# Patient Record
Sex: FEMALE | Race: WHITE | HISPANIC OR LATINO | Employment: FULL TIME | URBAN - METROPOLITAN AREA
[De-identification: names, ages, dates, MRNs, and addresses within clinical notes are randomized per-mention and may not be internally consistent; named-entity substitution may affect disease eponyms.]

---

## 2024-04-02 ENCOUNTER — OFFICE VISIT (OUTPATIENT)
Dept: URGENT CARE | Facility: CLINIC | Age: 69
End: 2024-04-02
Payer: COMMERCIAL

## 2024-04-02 VITALS
TEMPERATURE: 98.6 F | BODY MASS INDEX: 16.9 KG/M2 | DIASTOLIC BLOOD PRESSURE: 98 MMHG | RESPIRATION RATE: 16 BRPM | OXYGEN SATURATION: 98 % | WEIGHT: 99 LBS | HEART RATE: 71 BPM | SYSTOLIC BLOOD PRESSURE: 178 MMHG | HEIGHT: 64 IN

## 2024-04-02 DIAGNOSIS — M25.552 PAIN OF LEFT HIP: ICD-10-CM

## 2024-04-02 DIAGNOSIS — R03.0 ELEVATED BLOOD PRESSURE READING: ICD-10-CM

## 2024-04-02 PROCEDURE — 99213 OFFICE O/P EST LOW 20 MIN: CPT | Performed by: NURSE PRACTITIONER

## 2024-04-02 PROCEDURE — 3077F SYST BP >= 140 MM HG: CPT | Performed by: NURSE PRACTITIONER

## 2024-04-02 PROCEDURE — 3080F DIAST BP >= 90 MM HG: CPT | Performed by: NURSE PRACTITIONER

## 2024-04-02 RX ORDER — KETOROLAC TROMETHAMINE 30 MG/ML
30 INJECTION, SOLUTION INTRAMUSCULAR; INTRAVENOUS ONCE
Status: COMPLETED | OUTPATIENT
Start: 2024-04-02 | End: 2024-04-02

## 2024-04-02 RX ADMIN — KETOROLAC TROMETHAMINE 30 MG: 30 INJECTION, SOLUTION INTRAMUSCULAR; INTRAVENOUS at 12:35

## 2024-04-02 NOTE — PROGRESS NOTES
Chief Complaint   Patient presents with    Hip Pain     Has hip pain X1 week given an injection I the past but currently given Naproxen and not helping with pain       HISTORY OF PRESENT ILLNESS: Patient is a pleasant 68 y.o. female who presents to urgent care today with complaints of left hip pain for the past two weeks. She denies any injury or trauma. She was seen in  after pain started. X-ray negative, given Naproxen without improvement. Pain is continuing, improves with ambulation. She is Ukrainian speaking, a  is used for visit today.     There are no problems to display for this patient.      Allergies:Patient has no known allergies.    Current Outpatient Medications Ordered in Epic   Medication Sig Dispense Refill    naproxen (NAPROSYN) 500 MG Tab Take 1 Tablet by mouth 2 times a day with meals. 60 Tablet 0    Diclofenac Sodium 1 % Gel Apply to right knee and hip TID prn pain (Patient not taking: Reported on 3/18/2024) 100 g 0     No current Epic-ordered facility-administered medications on file.       History reviewed. No pertinent past medical history.    Social History     Tobacco Use    Smoking status: Never    Smokeless tobacco: Never   Substance Use Topics    Alcohol use: No    Drug use: No       No family status information on file.   History reviewed. No pertinent family history.    ROS:  Review of Systems   Constitutional: Negative for fever, chills, weight loss, malaise, and fatigue.   HENT: Negative for ear pain, nosebleeds, congestion, sore throat and neck pain.    Eyes: Negative for vision changes.   Neuro: Negative for headache, sensory changes, weakness, seizure, LOC.   Cardiovascular: Negative for chest pain, palpitations, orthopnea and leg swelling.   Respiratory: Negative for cough, sputum production, shortness of breath and wheezing.   Gastrointestinal: Negative for abdominal pain, nausea, vomiting or diarrhea.   Genitourinary: Negative for dysuria, urgency and  "frequency.  Musculoskeletal: Positive for left hip pain. Negative for falls, neck pain, back pain, myalgias.   Skin: Negative for rash, diaphoresis.     Exam:  BP (!) 178/98 (BP Location: Right arm, Patient Position: Sitting, BP Cuff Size: Child)   Pulse 71   Temp 37 °C (98.6 °F) (Temporal)   Resp 16   Ht 1.626 m (5' 4\")   Wt 44.9 kg (99 lb)   SpO2 98%   General: well-nourished, well-developed female in NAD  Head: normocephalic, atraumatic  Eyes: PERRLA, no conjunctival injection, acuity grossly intact, lids normal.  Ears: normal shape and symmetry, no tenderness, no discharge. External canals are without any significant edema or erythema. Tympanic membranes are without any inflammation, no effusion. Gross auditory acuity is intact.  Nose: symmetrical without tenderness, no discharge.  Mouth/Throat: reasonable hygiene, no erythema, exudates or tonsillar enlargement.  Neck: no masses, range of motion within normal limits, no tracheal deviation. No obvious thyroid enlargement.   Lymph: no cervical adenopathy. No supraclavicular adenopathy.   Neuro: alert and oriented. Cranial nerves 1-12 grossly intact. No sensory deficit.   Cardiovascular: regular rate and rhythm. No edema.  Pulmonary: no distress. Chest is symmetrical with respiration, no wheezes, crackles, or rhonchi.   Musculoskeletal: no clubbing, appropriate muscle tone, gait is stable. No midline spinal tenderness. Left hip: normal in appearance. FROM. Tenderness to lateral aspect of hip. Strength 5/5. N/V intact.   Skin: warm, dry, intact, no clubbing, no cyanosis, no rashes.   Psych: appropriate mood, affect, judgement.         Assessment/Plan:  1. Pain of left hip        2. Elevated blood pressure reading            Patient presents with left hip pain. X-ray reviewed from two weeks ago, negative. Toradol in given in clinic. Tylenol at home. Urgent referral placed to ortho.   Supportive care, differential diagnoses, and indications for immediate " follow-up discussed with patient.   Pathogenesis of diagnosis discussed including typical length and natural progression.   Instructed to return to clinic or nearest emergency department for any change in condition, further concerns, or worsening of symptoms.  Patient states understanding of the plan of care and discharge instructions.  Instructed to make an appointment, for follow up, with her primary care provider.  As a side note, blood pressure found to be elevated today, instructed to follow up with PCP.       Please note that this dictation was created using voice recognition software. I have made every reasonable attempt to correct obvious errors, but I expect that there are errors of grammar and possibly content that I did not discover before finalizing the note.  Previous clinic visit encounter reviewed and considered in medical decision making today.       DANIEL Diallo.

## 2024-12-18 ENCOUNTER — HOSPITAL ENCOUNTER (OUTPATIENT)
Dept: RADIOLOGY | Facility: MEDICAL CENTER | Age: 69
End: 2024-12-18
Attending: FAMILY MEDICINE
Payer: COMMERCIAL

## 2024-12-18 PROCEDURE — 77067 SCR MAMMO BI INCL CAD: CPT

## 2025-02-18 ENCOUNTER — HOSPITAL ENCOUNTER (EMERGENCY)
Facility: MEDICAL CENTER | Age: 70
End: 2025-02-18
Attending: STUDENT IN AN ORGANIZED HEALTH CARE EDUCATION/TRAINING PROGRAM
Payer: COMMERCIAL

## 2025-02-18 ENCOUNTER — APPOINTMENT (OUTPATIENT)
Dept: RADIOLOGY | Facility: MEDICAL CENTER | Age: 70
End: 2025-02-18
Attending: STUDENT IN AN ORGANIZED HEALTH CARE EDUCATION/TRAINING PROGRAM
Payer: COMMERCIAL

## 2025-02-18 VITALS
HEART RATE: 71 BPM | DIASTOLIC BLOOD PRESSURE: 72 MMHG | WEIGHT: 95.68 LBS | OXYGEN SATURATION: 97 % | RESPIRATION RATE: 18 BRPM | HEIGHT: 62 IN | TEMPERATURE: 98.8 F | BODY MASS INDEX: 17.61 KG/M2 | SYSTOLIC BLOOD PRESSURE: 132 MMHG

## 2025-02-18 DIAGNOSIS — G43.801 OTHER MIGRAINE WITH STATUS MIGRAINOSUS, NOT INTRACTABLE: ICD-10-CM

## 2025-02-18 LAB
ALBUMIN SERPL BCP-MCNC: 3.4 G/DL (ref 3.2–4.9)
ALBUMIN/GLOB SERPL: 0.9 G/DL
ALP SERPL-CCNC: 93 U/L (ref 30–99)
ALT SERPL-CCNC: 27 U/L (ref 2–50)
ANION GAP SERPL CALC-SCNC: 12 MMOL/L (ref 7–16)
AST SERPL-CCNC: 30 U/L (ref 12–45)
BASOPHILS # BLD AUTO: 0.6 % (ref 0–1.8)
BASOPHILS # BLD: 0.05 K/UL (ref 0–0.12)
BILIRUB SERPL-MCNC: 0.4 MG/DL (ref 0.1–1.5)
BUN SERPL-MCNC: 20 MG/DL (ref 8–22)
CALCIUM ALBUM COR SERPL-MCNC: 9.6 MG/DL (ref 8.5–10.5)
CALCIUM SERPL-MCNC: 9.1 MG/DL (ref 8.4–10.2)
CHLORIDE SERPL-SCNC: 105 MMOL/L (ref 96–112)
CO2 SERPL-SCNC: 21 MMOL/L (ref 20–33)
CREAT SERPL-MCNC: 0.54 MG/DL (ref 0.5–1.4)
EOSINOPHIL # BLD AUTO: 0.16 K/UL (ref 0–0.51)
EOSINOPHIL NFR BLD: 1.9 % (ref 0–6.9)
ERYTHROCYTE [DISTWIDTH] IN BLOOD BY AUTOMATED COUNT: 38.1 FL (ref 35.9–50)
GFR SERPLBLD CREATININE-BSD FMLA CKD-EPI: 100 ML/MIN/1.73 M 2
GLOBULIN SER CALC-MCNC: 3.9 G/DL (ref 1.9–3.5)
GLUCOSE SERPL-MCNC: 102 MG/DL (ref 65–99)
HCT VFR BLD AUTO: 33 % (ref 37–47)
HGB BLD-MCNC: 10.9 G/DL (ref 12–16)
IMM GRANULOCYTES # BLD AUTO: 0.05 K/UL (ref 0–0.11)
IMM GRANULOCYTES NFR BLD AUTO: 0.6 % (ref 0–0.9)
LYMPHOCYTES # BLD AUTO: 1.09 K/UL (ref 1–4.8)
LYMPHOCYTES NFR BLD: 12.7 % (ref 22–41)
MCH RBC QN AUTO: 29.8 PG (ref 27–33)
MCHC RBC AUTO-ENTMCNC: 33 G/DL (ref 32.2–35.5)
MCV RBC AUTO: 90.2 FL (ref 81.4–97.8)
MONOCYTES # BLD AUTO: 0.64 K/UL (ref 0–0.85)
MONOCYTES NFR BLD AUTO: 7.5 % (ref 0–13.4)
NEUTROPHILS # BLD AUTO: 6.58 K/UL (ref 1.82–7.42)
NEUTROPHILS NFR BLD: 76.7 % (ref 44–72)
NRBC # BLD AUTO: 0 K/UL
NRBC BLD-RTO: 0 /100 WBC (ref 0–0.2)
PLATELET # BLD AUTO: 376 K/UL (ref 164–446)
PMV BLD AUTO: 8.8 FL (ref 9–12.9)
POTASSIUM SERPL-SCNC: 3.7 MMOL/L (ref 3.6–5.5)
PROT SERPL-MCNC: 7.3 G/DL (ref 6–8.2)
RBC # BLD AUTO: 3.66 M/UL (ref 4.2–5.4)
SODIUM SERPL-SCNC: 138 MMOL/L (ref 135–145)
WBC # BLD AUTO: 8.6 K/UL (ref 4.8–10.8)

## 2025-02-18 PROCEDURE — 99284 EMERGENCY DEPT VISIT MOD MDM: CPT

## 2025-02-18 PROCEDURE — 700111 HCHG RX REV CODE 636 W/ 250 OVERRIDE (IP): Performed by: STUDENT IN AN ORGANIZED HEALTH CARE EDUCATION/TRAINING PROGRAM

## 2025-02-18 PROCEDURE — 85025 COMPLETE CBC W/AUTO DIFF WBC: CPT

## 2025-02-18 PROCEDURE — A9270 NON-COVERED ITEM OR SERVICE: HCPCS | Performed by: STUDENT IN AN ORGANIZED HEALTH CARE EDUCATION/TRAINING PROGRAM

## 2025-02-18 PROCEDURE — 36415 COLL VENOUS BLD VENIPUNCTURE: CPT

## 2025-02-18 PROCEDURE — 700102 HCHG RX REV CODE 250 W/ 637 OVERRIDE(OP): Performed by: STUDENT IN AN ORGANIZED HEALTH CARE EDUCATION/TRAINING PROGRAM

## 2025-02-18 PROCEDURE — 80053 COMPREHEN METABOLIC PANEL: CPT

## 2025-02-18 PROCEDURE — 70450 CT HEAD/BRAIN W/O DYE: CPT

## 2025-02-18 PROCEDURE — 96374 THER/PROPH/DIAG INJ IV PUSH: CPT

## 2025-02-18 RX ORDER — KETOROLAC TROMETHAMINE 15 MG/ML
15 INJECTION, SOLUTION INTRAMUSCULAR; INTRAVENOUS ONCE
Status: COMPLETED | OUTPATIENT
Start: 2025-02-18 | End: 2025-02-18

## 2025-02-18 RX ORDER — ACETAMINOPHEN 500 MG
1000 TABLET ORAL ONCE
Status: COMPLETED | OUTPATIENT
Start: 2025-02-18 | End: 2025-02-18

## 2025-02-18 RX ADMIN — ACETAMINOPHEN 1000 MG: 500 TABLET ORAL at 21:35

## 2025-02-18 RX ADMIN — KETOROLAC TROMETHAMINE 15 MG: 15 INJECTION, SOLUTION INTRAMUSCULAR; INTRAVENOUS at 22:00

## 2025-02-18 ASSESSMENT — PAIN DESCRIPTION - PAIN TYPE: TYPE: ACUTE PAIN

## 2025-02-19 NOTE — DISCHARGE INSTRUCTIONS
Please call the attached number to schedule a follow-up appointment with your primary care doctor soon as possible.  Return to the ER with any sudden onset severe returning headache, vision change, blurry or double vision, numbness or weakness of the arms legs or face, recurrent vomiting or if you are otherwise feeling worse.

## 2025-02-19 NOTE — ED TRIAGE NOTES
".  Chief Complaint   Patient presents with    Headache    Ear Pain     PT states that she had headache mostly on the left side that goes to her left ear like 4 days ago. Pt hx of hypertension.     .BP (!) 145/80   Pulse 90   Temp 37.7 °C (99.9 °F) (Temporal)   Resp 20   Ht 1.575 m (5' 2\")   Wt 43.4 kg (95 lb 10.9 oz)   SpO2 96%   BMI 17.50 kg/m²     "

## 2025-02-19 NOTE — ED NOTES
Patient is AAOX4. Breathing is even and unlabored. Patient was read discharge instructions. Patient and family member do not have any questions or concerns at this time. Patient tolerated iv removal. Patient ambulated self unassisted to pov

## 2025-02-19 NOTE — ED PROVIDER NOTES
ED Provider Note    CHIEF COMPLAINT  Chief Complaint   Patient presents with    Headache    Ear Pain     PT states that she had headache mostly on the left side that goes to her left ear like 4 days ago. Pt hx of hypertension.       EXTERNAL RECORDS REVIEWED  Outpatient Notes patient seen by urgent care provider for hip pain and high blood pressure April 2, 2024.  No more recent contributory records available.    HPI/ROS  LIMITATION TO HISTORY   Select: Language Bengali,  Used   OUTSIDE HISTORIAN(S):  Family daughter contributing history    Shannan Berg is a 69 y.o. female who presents to the emergency department for evaluation of 4 days of left-sided throbbing headache.  The patient has a history of headaches presenting similarly but this is lasted much longer and has become more severe.  Headache started gradually and has been worsening.  Pain is located to the temporal aspect of the head and involving the left ear.  She denies facial pain, forehead pain, hearing loss or muffled hearing, ringing in her ear or dizziness.  She denies fever but has had some chills intermittently.  She denies neck pain or stiffness.  She denies vision changes, difficulty swallowing or speaking, difficulty walking, numbness or weakness in the arms legs or face.    PAST MEDICAL HISTORY   High blood pressure    SURGICAL HISTORY  patient denies any surgical history    FAMILY HISTORY  History reviewed. No pertinent family history.    SOCIAL HISTORY  Social History     Tobacco Use    Smoking status: Never    Smokeless tobacco: Never   Vaping Use    Vaping status: Never Used   Substance and Sexual Activity    Alcohol use: No    Drug use: No    Sexual activity: Not on file       CURRENT MEDICATIONS  Home Medications       Reviewed by Wilbert Woodward R.N. (Registered Nurse) on 02/18/25 at 2013  Med List Status: Not Addressed     Medication Last Dose Status   Diclofenac Sodium 1 % Gel  Active   naproxen (NAPROSYN) 500  "MG Tab  Active                    ALLERGIES  No Known Allergies    PHYSICAL EXAM  VITAL SIGNS: BP (!) 145/80   Pulse 90   Temp 37.7 °C (99.9 °F) (Temporal)   Resp 20   Ht 1.575 m (5' 2\")   Wt 43.4 kg (95 lb 10.9 oz)   SpO2 96%   BMI 17.50 kg/m²    Constitutional: No acute distress, pleasant elderly female.  HEENT: Atraumatic, normocephalic, no rash or cutaneous skin changes to the scalp, pupils are equal round reactive to light, nose normal, mouth shows moist mucous membranes, external auditory canals unremarkable.  Left tympanic membrane unremarkable.  No vesicular lesions in the canal.  Neck: Supple, full range of motion.  No tenderness to the neck.  Cardiovascular: Regular rate and rhythm, no murmur, rub or gallop, 2+ pulses peripherally x4  Thorax & Lungs: No respiratory distress, clear breath sounds  GI: Soft, non-distended, non-tender, no rebound  Skin: Warm, dry, no acute rash or lesion  Musculoskeletal: Moving all extremities, no acute deformity, no edema, no tenderness  Neurologic: A&Ox3, at baseline mentation, cranial nerves II through XII intact, ambulatory with a steady gait, negative Romberg, normal finger-nose-finger, 5 out of 5 strength and normal sensation throughout.  Speech is clear.  Psychiatric: Appropriate affect for situation at this time      EKG/LABS  Labs Reviewed   CBC WITH DIFFERENTIAL - Abnormal; Notable for the following components:       Result Value    RBC 3.66 (*)     Hemoglobin 10.9 (*)     Hematocrit 33.0 (*)     MPV 8.8 (*)     Neutrophils-Polys 76.70 (*)     Lymphocytes 12.70 (*)     All other components within normal limits   COMP METABOLIC PANEL - Abnormal; Notable for the following components:    Glucose 102 (*)     Globulin 3.9 (*)     All other components within normal limits   ESTIMATED GFR         RADIOLOGY/PROCEDURES   I have independently interpreted the diagnostic imaging associated with this visit and am waiting the final reading from the radiologist.   My " preliminary interpretation is as follows: No intracranial hemorrhage or large mass lesion    Radiologist interpretation:  CT-HEAD W/O   Final Result         1.  No acute intracranial abnormality.                   COURSE & MEDICAL DECISION MAKING    ASSESSMENT, COURSE AND PLAN  Care Narrative:     Patient presents to the ER for evaluation of 4 days of waxing and waning gradual onset left-sided headache.  History of recurrent headaches previously though no formal diagnosis of migraines.  This headache feels similar to these but has lasted longer.  Her examination is unremarkable.  No neurologic deficits apparent.  No meningismus.  No fevers or infectious prodromal symptoms.  No evidence of Court Hunt syndrome, scalp cellulitis or abscess.  At this point no clinical history or examination suggestive of subarachnoid hemorrhage, meningitis, venous sinus thrombosis.  Did elect to perform noncontrast CT scan of the head to assess for intracranial hemorrhage given patient's age but this was ultimately normal.  Basic laboratory panel largely unremarkable other than for some likely chronic anemia.  No report of any recent bleeding.  Patient medicated for pain with ketorolac and acetaminophen with resolution of her symptoms.  Suspect migraine.  Recommended follow-up with primary care doctor to discuss his presentation and monitor for any recurrent symptoms or discussed need for ongoing evaluation.  Patient comfortable with discharge plan.  Return precautions discussed and all questions answered and she was discharged stable condition      FINAL DIAGNOSIS  1. Other migraine with status migrainosus, not intractable         Electronically signed by: Liban Melton M.D., 2/18/2025 9:15 PM

## 2025-02-27 ENCOUNTER — HOSPITAL ENCOUNTER (EMERGENCY)
Facility: MEDICAL CENTER | Age: 70
End: 2025-02-28
Attending: EMERGENCY MEDICINE
Payer: COMMERCIAL

## 2025-02-27 DIAGNOSIS — R51.9 ACUTE NONINTRACTABLE HEADACHE, UNSPECIFIED HEADACHE TYPE: ICD-10-CM

## 2025-02-27 LAB
FLUAV RNA SPEC QL NAA+PROBE: NEGATIVE
FLUBV RNA SPEC QL NAA+PROBE: NEGATIVE
RSV RNA SPEC QL NAA+PROBE: NEGATIVE
SARS-COV-2 RNA RESP QL NAA+PROBE: NOTDETECTED
SPECIMEN SOURCE: NORMAL

## 2025-02-27 PROCEDURE — 700102 HCHG RX REV CODE 250 W/ 637 OVERRIDE(OP): Performed by: EMERGENCY MEDICINE

## 2025-02-27 PROCEDURE — 99284 EMERGENCY DEPT VISIT MOD MDM: CPT

## 2025-02-27 PROCEDURE — A9270 NON-COVERED ITEM OR SERVICE: HCPCS | Performed by: EMERGENCY MEDICINE

## 2025-02-27 PROCEDURE — 96372 THER/PROPH/DIAG INJ SC/IM: CPT

## 2025-02-27 PROCEDURE — 0241U HCHG SARS-COV-2 COVID-19 NFCT DS RESP RNA 4 TRGT MIC: CPT

## 2025-02-27 PROCEDURE — 700111 HCHG RX REV CODE 636 W/ 250 OVERRIDE (IP): Mod: JZ | Performed by: EMERGENCY MEDICINE

## 2025-02-27 RX ORDER — KETOROLAC TROMETHAMINE 15 MG/ML
15 INJECTION, SOLUTION INTRAMUSCULAR; INTRAVENOUS ONCE
Status: COMPLETED | OUTPATIENT
Start: 2025-02-27 | End: 2025-02-27

## 2025-02-27 RX ORDER — PROMETHAZINE HYDROCHLORIDE 25 MG/1
25 TABLET ORAL ONCE
Status: COMPLETED | OUTPATIENT
Start: 2025-02-27 | End: 2025-02-27

## 2025-02-27 RX ORDER — SUMATRIPTAN 50 MG/1
50 TABLET, FILM COATED ORAL
Status: SHIPPED | COMMUNITY
End: 2025-03-04

## 2025-02-27 RX ADMIN — PROMETHAZINE HYDROCHLORIDE 25 MG: 25 TABLET ORAL at 22:33

## 2025-02-27 RX ADMIN — KETOROLAC TROMETHAMINE 15 MG: 15 INJECTION, SOLUTION INTRAMUSCULAR; INTRAVENOUS at 22:33

## 2025-02-27 ASSESSMENT — FIBROSIS 4 INDEX: FIB4 SCORE: 1.04

## 2025-02-28 VITALS
WEIGHT: 97.22 LBS | HEART RATE: 79 BPM | OXYGEN SATURATION: 95 % | TEMPERATURE: 100.2 F | SYSTOLIC BLOOD PRESSURE: 145 MMHG | HEIGHT: 63 IN | RESPIRATION RATE: 18 BRPM | DIASTOLIC BLOOD PRESSURE: 74 MMHG | BODY MASS INDEX: 17.23 KG/M2

## 2025-02-28 NOTE — ED NOTES
Patient is stable for d/c at this time, anticipatory guidance provided, close follow-up encouraged, and ED return instructions have been detailed. Patient and family are both agreeable to the disposition, and plan and discharged home in ambulatory state and good condition.    
Pt tolerates PO fluids  
None

## 2025-02-28 NOTE — ED TRIAGE NOTES
"Chief Complaint   Patient presents with    Migraine     C/o migraine headache primarily on the left side x1 week seen for same recently. Taking pain meds at home with no relief. No recently illness.    Dizziness    BP (!) 145/74   Pulse 90   Temp (!) 38.1 °C (100.5 °F) (Temporal)   Resp 18   Ht 1.6 m (5' 3\")   Wt 44.1 kg (97 lb 3.6 oz)   SpO2 94%      Patient prescribed PRN 50mg sumatriptan. Daughter states that pt is reluctant and/or non compliant with taking medication for unknown reason and hasn't taken it today. Last dose was at bedtime. Pt took 1g of Tylenol at 1400 with no relief.   "

## 2025-02-28 NOTE — ED PROVIDER NOTES
ED Provider Note    CHIEF COMPLAINT  Chief Complaint   Patient presents with    Migraine     C/o migraine headache primarily on the left side x1 week seen for same recently. Taking pain meds at home with no relief. No recently illness.    Dizziness     C/o dizziness with ambulation. Denies n/v/d        [facility]    Primary care provider: Monster Maldonado M.D.   History obtained from: Patient and daughter  History limited by: None     Shannan Barry is a 69 y.o. female who presents to the ED with daughter complaining of left-sided headache for at least a week.  Patient was seen in this ED on February 18 for the same and CT head without acute findings.  Daughter reports that patient saw her PCP and was prescribed Imitrex.  However, the Imitrex did not help her headache and so she decided not to take it.  She did try some acetaminophen today without improvement.  Patient denies pain anywhere else.  No recent injury or trauma.  No recent illness/fever/congestion/cough.  No shortness of breath/difficulty breathing.  She denies visual change but does report light bothers her slightly.  Patient also complaining of dizziness with ambulation.  No nausea/vomiting/diarrhea/dysuria.    REVIEW OF SYSTEMS  Please see HPI for pertinent positives/negatives.  All other systems reviewed and are negative.     PAST MEDICAL HISTORY  Past Medical History:   Diagnosis Date    Hypertension         SURGICAL HISTORY  History reviewed. No pertinent surgical history.     SOCIAL HISTORY  Social History     Tobacco Use    Smoking status: Never    Smokeless tobacco: Never   Vaping Use    Vaping status: Never Used   Substance and Sexual Activity    Alcohol use: No    Drug use: No    Sexual activity: Not on file        FAMILY HISTORY  History reviewed. No pertinent family history.     CURRENT MEDICATIONS  Home Medications       Reviewed by Nela Hodges R.N. (Registered Nurse) on  "02/27/25 at 2147  Med List Status: Not Addressed     Medication Last Dose Status   Diclofenac Sodium 1 % Gel  Active   naproxen (NAPROSYN) 500 MG Tab  Active   SUMAtriptan (IMITREX) 50 MG Tab 2/26/2025 Active                     ALLERGIES  No Known Allergies     PHYSICAL EXAM  VITAL SIGNS: BP (!) 145/74   Pulse 79   Temp 37.9 °C (100.2 °F) (Temporal)   Resp 18   Ht 1.6 m (5' 3\")   Wt 44.1 kg (97 lb 3.6 oz)   SpO2 95%   BMI 17.22 kg/m²  @[id number]::@     Pulse ox interpretation: 94% I interpret this pulse ox as normal     Constitutional: Well developed, well nourished, alert in no apparent distress, nontoxic appearance    HENT: No external signs of trauma, normocephalic, oropharynx moist and clear   Eyes: PERRL, EOMI, vision and visual fields are grossly intact bilaterally, conjunctiva without erythema, no discharge, no icterus    Neck: Soft and supple, trachea midline, no stridor, no tenderness, no LAD, good ROM    Cardiovascular: Regular rate and rhythm, no murmurs/rubs/gallops, strong distal pulses and good perfusion    Thorax & Lungs: No respiratory distress, CTAB    Abdomen: Soft, nontender, nondistended, no guarding, no rebound, normal BS    Extremities: No cyanosis, no edema, no gross deformity, intact distal pulses with brisk cap refill    Skin: Warm, dry, no pallor/cyanosis, no rash noted      Neuro: Alert and oriented to person, place, and time.  GCS 15.  CN II-XII grossly intact.  Normal speech.  Equal strength bilateral UE/LE.  Sensation intact to touch.  No cerebellar signs including finger-to-nose, rapid alternating hand movements and heel-to-shin bilaterally.  Normal gait.    Psychiatric: Cooperative      NIH STROKE SCALE    1A: Level of Consciousness=0  Alert; keenly responsive 0  Arouses to minor stimulation +1  Requires repeated stimulation to arouse +2  Movements to pain +2  Postures or unresponsive +3    1B: Ask Month and Age=0  Both questions right 0  1 question right +1  0 questions " right +2  Dysarthric/intubated/trauma/language barrier +1  Aphasic +2    1C: 'Blink Eyes' & 'Squeeze Hands'=0  (Pantomime commands if communication barrier)  Performs both tasks 0  Performs 1 task +1  Performs 0 tasks +2    2: Test Horizontal Extraocular Movements=0  Normal 0  Partial gaze palsy: can be overcome +1  Partial gaze palsy: corrects with oculocephalic reflex +1  Forced gaze palsy: cannot be overcome +2    3: Test Visual Fields=0  No visual loss 0  Partial hemianopia +1  Complete hemianopia +2  Patient is bilaterally blind +3  Bilateral hemianopia +3    4: Test Facial Palsy=0  (Use grimace if obtunded)  Normal symmetry 0  Minor paralysis (flat nasolabial fold, smile asymetry) +1  Partial paralysis (lower face) +2  Unilateral complete paralysis (upper/lower face) +3  Bilateral complete paralysis (upper/lower face) +3    5A: Test Left Arm Motor Drift=0  No drift for 10 Seconds 0  Drift, but doesn't hit bed +1  Drift, hits bed +2  Some effort against gravity +2  No effort against gravity +3  No movement +4  Amputation/joint fusion 0    5B: Test Right Arm Motor Drift=0  No drift for 10 seconds 0  Drift, but doesn't hit bed +1  Drift, hits bed +2  Some effort against gravity +2  No effort against gravity +3  No movement +4  Amputation/joint fusion 0    6A: Test Left Leg Motor Drift=0  No drift for 5 Seconds 0  Drift, but doesn't hit bed +1  Drift, hits bed +2  Some effort against gravity +2  No effort against gravity +3  No movement +4  Amputation/joint fusion 0    6B: Test Right Leg Motor Drift=0  No drift for 5 Seconds 0  Drift, but doesn't hit bed +1  Drift, hits bed +2  Some effort against gravity +2  No effort against gravity +3  No movement +4  Amputation/joint fusion 0    7: Test Limb Ataxia =0  (FNF/Heel-Shin)  No ataxia 0  Ataxia in 1 limb +1  Ataxia in 2 limbs +2  Does not understand 0  Paralyzed 0  Amputation/joint fusion 0    8: Test Sensation=0  Normal; no sensory loss 0  Mild-moderate loss: less  sharp/more dull +1  Mild-moderate loss: can sense being touched +1  Complete loss: cannot sense being touched at all +2  No response and quadriplegic +2  Coma/unresponsive +2    9: Test Language/Aphasia=0  Normal; no aphasia 0  Mild-moderate aphasia: some obvious changes, without significant limitation +1  Severe aphasia: fragmentary expression, inference needed, cannot identify materials +2  Mute/global aphasia: no usable speech/auditory comprehension +3  Coma/unresponsive +3    10: Test Dysarthria=0  Normal 0  Mild-moderate dysarthria: slurring but can be understood +1  Severe dysarthria: unintelligble slurring or out of proportion to dysphasia +2  Mute/anarthric +2  Intubated/unable to test 0    11: Test Extinction/Inattention=0  No abnormality 0  Visual/tactile/auditory/spatial/personal inattention +1  Extinction to bilateral simultaneous stimulation +1  Profound cristiano-inattention (ex: does not recognize own hand) +2  Extinction to >1 modality +2      NIH Stroke Scale=0      DIAGNOSTIC STUDIES / PROCEDURES        LABS  All labs reviewed by me.     Results for orders placed or performed during the hospital encounter of 02/27/25   CoV-2, Flu A/B, And RSV by PCR ([employer])    Collection Time: 02/27/25 11:00 PM    Specimen: Nasopharyngeal; Respirate   Result Value Ref Range    Influenza virus A RNA Negative Negative    Influenza virus B, PCR Negative Negative    RSV, PCR Negative Negative    SARS-CoV-2 by PCR NotDetected     SARS-CoV-2 Source NP Swab         RADIOLOGY  I have independently interpreted the diagnostic imaging associated with this visit and am waiting the final reading from the radiologist.     No orders to display          COURSE & MEDICAL DECISION MAKING  Nursing notes, VS, PMSFHx reviewed in chart.     Review of past medical records shows the patient was last seen in this ED February 18, 2025 for similar symptoms and CT head without acute findings.  Patient was treated with Toradol and acetaminophen  with improvement and discharged with diagnosis of migraine.  Patient had outpatient visit on April 2, 2024 regarding left hip pain and elevated blood pressure reading.      Differential diagnoses considered include but are not limited to: Tension/migraine/cluster HA, intracranial hemorrhage/SAH, aneurysm, dissection, intracranial HTN, central venous thrombosis      ED Observation Status? No; Patient does not meet criteria for ED Observation.       Discussion of management with other QHP or appropriate source(s): None     Escalation of care considered, and ultimately not performed: diagnostic imaging.     Decision tools and prescription drugs considered including, but not limited to: Pain Medications   .        History and physical exam as above.  This is a 69-year-old female patient with medical history including hypertension who presents with daughter to the ED with above complaints.  Exam in the ED is benign without evidence of focal neurological findings or concerning features to suggest need for emergent imaging or LP.  Patient was seen in this ED on February 18 with similar complaints and CT head at that time without acute findings.  Patient was treated today with oral Phenergan and IM Toradol with improvement of her symptoms.  She tolerated oral intake without difficulty.  She had low-grade fever and influenza/RSV/COVID testing returned negative.  I discussed the findings with patient and daughter.  On recheck, patient is very pleasant, in no acute distress and nontoxic in appearance.  At this time, I have low clinical suspicion for concerning pathology such as CVA, dissection, meningitis.  I discussed with them supportive home care, outpatient follow-up and return to ED precautions and daughter feels comfortable with monitoring patient at home.  Patient also noted with elevated blood pressure reading without evidence for hypertensive emergency and can follow-up on outpatient basis for further management.   Patient and daughter verbalized understanding and agreed with plan of care with no further questions or concerns.      The patient is referred to a primary physician for blood pressure management, diabetic screening, and for all other preventative health concerns.       FINAL IMPRESSION  1. Acute nonintractable headache, unspecified headache type Acute          DISPOSITION  Patient will be discharged home in stable condition.       FOLLOW UP  Monster Maldonado M.D.  [id number]  [address]  [place] [address]  [phone]    Call today      [facility], Emergency Dept  [address]  [place] [id number] [phone]    If symptoms worsen         OUTPATIENT MEDICATIONS  Discharge Medication List as of 2/28/2025 12:26 AM             Electronically signed by: Alonzo Olson D.O., 2/27/2025 10:09 PM      Portions of this record were made with voice recognition software.  Despite my review, errors may remain.  Please interpret this chart in the appropriate context.

## 2025-03-01 ENCOUNTER — HOSPITAL ENCOUNTER (EMERGENCY)
Facility: MEDICAL CENTER | Age: 70
End: 2025-03-01
Attending: STUDENT IN AN ORGANIZED HEALTH CARE EDUCATION/TRAINING PROGRAM
Payer: COMMERCIAL

## 2025-03-01 ENCOUNTER — APPOINTMENT (OUTPATIENT)
Dept: RADIOLOGY | Facility: MEDICAL CENTER | Age: 70
End: 2025-03-01
Attending: STUDENT IN AN ORGANIZED HEALTH CARE EDUCATION/TRAINING PROGRAM
Payer: COMMERCIAL

## 2025-03-01 VITALS
SYSTOLIC BLOOD PRESSURE: 136 MMHG | BODY MASS INDEX: 16.52 KG/M2 | WEIGHT: 96.78 LBS | OXYGEN SATURATION: 97 % | TEMPERATURE: 98.5 F | HEART RATE: 80 BPM | RESPIRATION RATE: 18 BRPM | HEIGHT: 64 IN | DIASTOLIC BLOOD PRESSURE: 73 MMHG

## 2025-03-01 DIAGNOSIS — G43.801 OTHER MIGRAINE WITH STATUS MIGRAINOSUS, NOT INTRACTABLE: ICD-10-CM

## 2025-03-01 LAB
ALBUMIN SERPL BCP-MCNC: 3.2 G/DL (ref 3.2–4.9)
ALBUMIN/GLOB SERPL: 0.8 G/DL
ALP SERPL-CCNC: 111 U/L (ref 30–99)
ALT SERPL-CCNC: 12 U/L (ref 2–50)
ANION GAP SERPL CALC-SCNC: 11 MMOL/L (ref 7–16)
AST SERPL-CCNC: 15 U/L (ref 12–45)
BASOPHILS # BLD AUTO: 0.4 % (ref 0–1.8)
BASOPHILS # BLD: 0.04 K/UL (ref 0–0.12)
BILIRUB SERPL-MCNC: 0.4 MG/DL (ref 0.1–1.5)
BUN SERPL-MCNC: 14 MG/DL (ref 8–22)
CALCIUM ALBUM COR SERPL-MCNC: 9.9 MG/DL (ref 8.5–10.5)
CALCIUM SERPL-MCNC: 9.3 MG/DL (ref 8.4–10.2)
CHLORIDE SERPL-SCNC: 98 MMOL/L (ref 96–112)
CO2 SERPL-SCNC: 24 MMOL/L (ref 20–33)
CREAT SERPL-MCNC: 0.51 MG/DL (ref 0.5–1.4)
EOSINOPHIL # BLD AUTO: 0.12 K/UL (ref 0–0.51)
EOSINOPHIL NFR BLD: 1.2 % (ref 0–6.9)
ERYTHROCYTE [DISTWIDTH] IN BLOOD BY AUTOMATED COUNT: 39.4 FL (ref 35.9–50)
GFR SERPLBLD CREATININE-BSD FMLA CKD-EPI: 101 ML/MIN/1.73 M 2
GLOBULIN SER CALC-MCNC: 4.2 G/DL (ref 1.9–3.5)
GLUCOSE SERPL-MCNC: 95 MG/DL (ref 65–99)
HCT VFR BLD AUTO: 35 % (ref 37–47)
HGB BLD-MCNC: 11 G/DL (ref 12–16)
IMM GRANULOCYTES # BLD AUTO: 0.07 K/UL (ref 0–0.11)
IMM GRANULOCYTES NFR BLD AUTO: 0.7 % (ref 0–0.9)
LYMPHOCYTES # BLD AUTO: 1.21 K/UL (ref 1–4.8)
LYMPHOCYTES NFR BLD: 12.3 % (ref 22–41)
MCH RBC QN AUTO: 28.5 PG (ref 27–33)
MCHC RBC AUTO-ENTMCNC: 31.4 G/DL (ref 32.2–35.5)
MCV RBC AUTO: 90.7 FL (ref 81.4–97.8)
MONOCYTES # BLD AUTO: 0.71 K/UL (ref 0–0.85)
MONOCYTES NFR BLD AUTO: 7.2 % (ref 0–13.4)
NEUTROPHILS # BLD AUTO: 7.65 K/UL (ref 1.82–7.42)
NEUTROPHILS NFR BLD: 78.2 % (ref 44–72)
NRBC # BLD AUTO: 0 K/UL
NRBC BLD-RTO: 0 /100 WBC (ref 0–0.2)
PLATELET # BLD AUTO: 480 K/UL (ref 164–446)
PMV BLD AUTO: 8.4 FL (ref 9–12.9)
POTASSIUM SERPL-SCNC: 4.1 MMOL/L (ref 3.6–5.5)
PROT SERPL-MCNC: 7.4 G/DL (ref 6–8.2)
RBC # BLD AUTO: 3.86 M/UL (ref 4.2–5.4)
SODIUM SERPL-SCNC: 133 MMOL/L (ref 135–145)
WBC # BLD AUTO: 9.8 K/UL (ref 4.8–10.8)

## 2025-03-01 PROCEDURE — 700105 HCHG RX REV CODE 258: Performed by: STUDENT IN AN ORGANIZED HEALTH CARE EDUCATION/TRAINING PROGRAM

## 2025-03-01 PROCEDURE — 99284 EMERGENCY DEPT VISIT MOD MDM: CPT

## 2025-03-01 PROCEDURE — 80053 COMPREHEN METABOLIC PANEL: CPT

## 2025-03-01 PROCEDURE — 700117 HCHG RX CONTRAST REV CODE 255: Performed by: STUDENT IN AN ORGANIZED HEALTH CARE EDUCATION/TRAINING PROGRAM

## 2025-03-01 PROCEDURE — 700111 HCHG RX REV CODE 636 W/ 250 OVERRIDE (IP): Mod: JZ | Performed by: STUDENT IN AN ORGANIZED HEALTH CARE EDUCATION/TRAINING PROGRAM

## 2025-03-01 PROCEDURE — 96374 THER/PROPH/DIAG INJ IV PUSH: CPT

## 2025-03-01 PROCEDURE — 36415 COLL VENOUS BLD VENIPUNCTURE: CPT

## 2025-03-01 PROCEDURE — 96375 TX/PRO/DX INJ NEW DRUG ADDON: CPT

## 2025-03-01 PROCEDURE — 70496 CT ANGIOGRAPHY HEAD: CPT

## 2025-03-01 PROCEDURE — 700102 HCHG RX REV CODE 250 W/ 637 OVERRIDE(OP): Performed by: STUDENT IN AN ORGANIZED HEALTH CARE EDUCATION/TRAINING PROGRAM

## 2025-03-01 PROCEDURE — 85025 COMPLETE CBC W/AUTO DIFF WBC: CPT

## 2025-03-01 PROCEDURE — A9270 NON-COVERED ITEM OR SERVICE: HCPCS | Performed by: STUDENT IN AN ORGANIZED HEALTH CARE EDUCATION/TRAINING PROGRAM

## 2025-03-01 RX ORDER — DIPHENHYDRAMINE HYDROCHLORIDE 50 MG/ML
25 INJECTION, SOLUTION INTRAMUSCULAR; INTRAVENOUS ONCE
Status: COMPLETED | OUTPATIENT
Start: 2025-03-01 | End: 2025-03-01

## 2025-03-01 RX ORDER — SUMATRIPTAN 50 MG/1
50 TABLET, FILM COATED ORAL
Qty: 10 TABLET | Refills: 3 | Status: ON HOLD | OUTPATIENT
Start: 2025-03-01 | End: 2025-03-06

## 2025-03-01 RX ORDER — PROCHLORPERAZINE EDISYLATE 5 MG/ML
5 INJECTION INTRAMUSCULAR; INTRAVENOUS ONCE
Status: COMPLETED | OUTPATIENT
Start: 2025-03-01 | End: 2025-03-01

## 2025-03-01 RX ORDER — ACETAMINOPHEN 500 MG
1000 TABLET ORAL ONCE
Status: COMPLETED | OUTPATIENT
Start: 2025-03-01 | End: 2025-03-01

## 2025-03-01 RX ORDER — KETOROLAC TROMETHAMINE 15 MG/ML
15 INJECTION, SOLUTION INTRAMUSCULAR; INTRAVENOUS ONCE
Status: COMPLETED | OUTPATIENT
Start: 2025-03-01 | End: 2025-03-01

## 2025-03-01 RX ORDER — SODIUM CHLORIDE, SODIUM LACTATE, POTASSIUM CHLORIDE, AND CALCIUM CHLORIDE .6; .31; .03; .02 G/100ML; G/100ML; G/100ML; G/100ML
1000 INJECTION, SOLUTION INTRAVENOUS ONCE
Status: COMPLETED | OUTPATIENT
Start: 2025-03-01 | End: 2025-03-01

## 2025-03-01 RX ADMIN — ACETAMINOPHEN 1000 MG: 500 TABLET ORAL at 20:09

## 2025-03-01 RX ADMIN — PROCHLORPERAZINE EDISYLATE 5 MG: 5 INJECTION INTRAMUSCULAR; INTRAVENOUS at 20:12

## 2025-03-01 RX ADMIN — KETOROLAC TROMETHAMINE 15 MG: 15 INJECTION, SOLUTION INTRAMUSCULAR; INTRAVENOUS at 20:12

## 2025-03-01 RX ADMIN — DIPHENHYDRAMINE HYDROCHLORIDE 25 MG: 50 INJECTION, SOLUTION INTRAMUSCULAR; INTRAVENOUS at 20:11

## 2025-03-01 RX ADMIN — SODIUM CHLORIDE, POTASSIUM CHLORIDE, SODIUM LACTATE AND CALCIUM CHLORIDE 1000 ML: 600; 310; 30; 20 INJECTION, SOLUTION INTRAVENOUS at 20:09

## 2025-03-01 RX ADMIN — IOHEXOL 90 ML: 350 INJECTION, SOLUTION INTRAVENOUS at 22:34

## 2025-03-01 ASSESSMENT — FIBROSIS 4 INDEX: FIB4 SCORE: 1.04

## 2025-03-02 NOTE — ED PROVIDER NOTES
ED Provider Note    CHIEF COMPLAINT  Chief Complaint   Patient presents with    Headache     LT parietal H/A Constant x 1 wk  3 rd visit for same Had neg CT of head  Denies N/V No fever or chills Denies recent trauma  No hx of chronic H/S  Mild photophobia  Pain severe  Per son pt can't sleep and cries a lot       EXTERNAL RECORDS REVIEWED  Outpatient Notes patient was seen in the emergency department twice in the last 2 weeks for a persistent left-sided headache.  At her first visit she had a CT of her head without contrast that was unremarkable.  Subsequently she was seen and prescribed Imitrex with no improvement.    HPI/ROS  LIMITATION TO HISTORY   Armenian,  used  OUTSIDE HISTORIAN(S):  Family son assisting with history    Shannan Barry is a 69 y.o. female who presents to the emergency department for evaluation of an ongoing left-sided parietal headache.  Pain has been constant now for 2 weeks.  Patient feels briefly better after she has been seen in the ER and gotten medications but states that 2 to 3 hours later the pain returns.  She states the pain is quite severe and she has difficulty sleeping due to the constant throbbing pain.  She states that she has some mild photophobia.  She denies nausea, vomiting, vision changes including blurry or double vision, seizures, numbness tingling or weakness of the arms legs or face, difficulty swallowing or speaking, difficulty walking, dizziness.  She has had no fevers or chills or any additional symptoms.  She has no prior diagnosed history of migraines.  She has seen her family doctor twice as well and is ordered for outpatient MRIs but these are not yet scheduled.    PAST MEDICAL HISTORY   has a past medical history of Hypertension.    SURGICAL HISTORY   has a past surgical history that includes no pertinent past surgical history.    FAMILY HISTORY  History reviewed. No pertinent family history.    SOCIAL HISTORY  Social History     Tobacco  "Use    Smoking status: Never    Smokeless tobacco: Never   Vaping Use    Vaping status: Never Used   Substance and Sexual Activity    Alcohol use: No    Drug use: No    Sexual activity: Not on file       CURRENT MEDICATIONS  Home Medications    **Home medications have not yet been reviewed for this encounter**       Audit from Redirected Encounters    **Home medications have not yet been reviewed for this encounter**         ALLERGIES  No Known Allergies    PHYSICAL EXAM  VITAL SIGNS: /73   Pulse 80   Temp 36.9 °C (98.5 °F) (Temporal)   Resp 18   Ht 1.626 m (5' 4\")   Wt 43.9 kg (96 lb 12.5 oz)   SpO2 97%   BMI 16.61 kg/m²    Constitutional: No acute distress, appearing currently.  HEENT: Atraumatic, normocephalic, pupils are equal round reactive to light, nose normal, mouth shows moist mucous membranes  Neck: Supple, no JVD, no tracheal deviation  Cardiovascular: Regular rate and rhythm, no murmur, rub or gallop, 2+ pulses peripherally x4  Thorax & Lungs: No respiratory distress, no wheezes, rales or rhonchi, no chest wall tenderness.  GI: Soft, non-distended, non-tender, no rebound  Skin: Warm, dry, no acute rash or lesion  Musculoskeletal: Moving all extremities, no acute deformity, no edema, no tenderness  Neurologic: A&Ox3, at baseline mentation, alert to the 12 intact, ambulatory with a steady gait, 5 out of 5 strength and normal sensation throughout upper and lower extremities proximally distally bilaterally.  Ambulatory with a steady gait.  Speech is clear and linear.  Psychiatric: Appropriate affect for situation at this time          EKG/LABS  Labs Reviewed   CBC WITH DIFFERENTIAL - Abnormal; Notable for the following components:       Result Value    RBC 3.86 (*)     Hemoglobin 11.0 (*)     Hematocrit 35.0 (*)     MCHC 31.4 (*)     Platelet Count 480 (*)     MPV 8.4 (*)     Neutrophils-Polys 78.20 (*)     Lymphocytes 12.30 (*)     Neutrophils (Absolute) 7.65 (*)     All other components within " normal limits   COMP METABOLIC PANEL - Abnormal; Notable for the following components:    Sodium 133 (*)     Alkaline Phosphatase 111 (*)     Globulin 4.2 (*)     All other components within normal limits   ESTIMATED GFR         RADIOLOGY/PROCEDURES   I have independently interpreted the diagnostic imaging associated with this visit and am waiting the final reading from the radiologist.   My preliminary interpretation is as follows: CT scan with no obvious venous sinus thrombosis.  No intracranial hemorrhage.    Radiologist interpretation:  CT-CTV HEAD WITH & W/O POST PROCESS   Final Result      1.  No acute intracranial abnormality.   2.  Dural venous sinuses enhance normally.   3.  No mass demonstrated.          COURSE & MEDICAL DECISION MAKING    ASSESSMENT, COURSE AND PLAN  Care Narrative:     Patient presents to the ER now for the fifth total visit for evaluation of an intractable headache, persistent for over 2 weeks.  Has no additional symptomology nor any deficits appreciated on neurologic examination, and remainder of her exam is otherwise completely unremarkable.  Her vitals are stable, she is afebrile and has had no reported fevers at home.  She has no meningismus on examination.  Has previously had noncontrast CT imaging of the brain without evidence of acute intracranial process/bleeding.  Has had no sudden onset or thunderclap nature of pain which has been gradual, waxing and waning and given no associated neurologic deficits, meningismus, vomiting I feel subarachnoid hemorrhage is highly unlikely.  I am concerned for venous sinus thrombosis given the persistence of her symptomology and will evaluate for such with CT venogram of her head.  She has seen her family practitioner on an outpatient basis for this and is ordered for MRI MRA of the brain which is not yet scheduled.  Will again treat with migraine cocktail, repeat labs pending results of CT venogram.     Laboratory workup unchanged from prior  recent labs demonstrating chronic mild anemia for which patient was recently placed on iron supplementation by PCP.  CT venography with no venous sinus thrombosis or mass appreciated.  No additional acute intracranial abnormality found.  On my reassessment patient reports she is symptom-free with complete resolution of her migraine.  Discussed what she has been using to manage her pain and patient has been underdosing Tylenol and not using any of her prescribed Imitrex or any additional analgesia.  Discussed appropriate utilization of Tylenol and Motrin, represcribed Imitrex and also refer the patient for neurology for headache clinic follow-up.  I encouraged her to schedule her previously ordered MRIs as well.  Patient feels very comfortable with discharge plan.  Return precautions discussed and all questions answered and she was discharged in stable condition.    Hydration: Based on the patient's presentation of Dehydration the patient was given IV fluids. IV Hydration was used because oral hydration was not adequate alone. Upon recheck following hydration, the patient was improved.          ADDITIONAL PROBLEMS MANAGED  None    DISPOSITION AND DISCUSSIONS  I have discussed management of the patient with the following physicians and REDD's: None    Discussion of management with other QHP or appropriate source(s): None     Escalation of care considered, and ultimately not performed:acute inpatient care management, however at this time, the patient is most appropriate for outpatient management    Decision tools and prescription drugs considered including, but not limited to: Pain Medications Imitrex .    FINAL DIAGNOSIS  1. Other migraine with status migrainosus, not intractable         Electronically signed by: Liban Melton M.D., 3/1/2025 7:40 PM

## 2025-03-02 NOTE — DISCHARGE INSTRUCTIONS
As we discussed for continued headache pain you should take Tylenol 1000 mg and ibuprofen 600 mg every 6 hours.  When your headache starts to recur I would recommend taking an Imitrex tablet as well.    Call the attached number to schedule a follow-up appointment with a neurologist and also continue to work with your primary doctor.  Call to schedule to have your MRIs done.

## 2025-03-03 ENCOUNTER — TELEPHONE (OUTPATIENT)
Dept: HEALTH INFORMATION MANAGEMENT | Facility: OTHER | Age: 70
End: 2025-03-03
Payer: COMMERCIAL

## 2025-03-04 ENCOUNTER — OFFICE VISIT (OUTPATIENT)
Dept: NEUROLOGY | Facility: MEDICAL CENTER | Age: 70
End: 2025-03-04
Attending: PSYCHIATRY & NEUROLOGY
Payer: COMMERCIAL

## 2025-03-04 ENCOUNTER — APPOINTMENT (OUTPATIENT)
Dept: RADIOLOGY | Facility: MEDICAL CENTER | Age: 70
End: 2025-03-04
Payer: COMMERCIAL

## 2025-03-04 ENCOUNTER — HOSPITAL ENCOUNTER (OUTPATIENT)
Facility: MEDICAL CENTER | Age: 70
End: 2025-03-06
Attending: EMERGENCY MEDICINE | Admitting: FAMILY MEDICINE
Payer: COMMERCIAL

## 2025-03-04 VITALS
HEART RATE: 83 BPM | WEIGHT: 95 LBS | OXYGEN SATURATION: 97 % | BODY MASS INDEX: 16.22 KG/M2 | TEMPERATURE: 98.5 F | DIASTOLIC BLOOD PRESSURE: 80 MMHG | SYSTOLIC BLOOD PRESSURE: 124 MMHG | HEIGHT: 64 IN | RESPIRATION RATE: 16 BRPM

## 2025-03-04 DIAGNOSIS — F32.A DEPRESSION, UNSPECIFIED DEPRESSION TYPE: ICD-10-CM

## 2025-03-04 DIAGNOSIS — R51.9 ACUTE INTRACTABLE HEADACHE, UNSPECIFIED HEADACHE TYPE: ICD-10-CM

## 2025-03-04 LAB
ALBUMIN SERPL BCP-MCNC: 3.3 G/DL (ref 3.2–4.9)
ALBUMIN/GLOB SERPL: 0.8 G/DL
ALP SERPL-CCNC: 101 U/L (ref 30–99)
ALT SERPL-CCNC: 10 U/L (ref 2–50)
ANION GAP SERPL CALC-SCNC: 12 MMOL/L (ref 7–16)
APTT PPP: 30.7 SEC (ref 24.7–36)
AST SERPL-CCNC: 18 U/L (ref 12–45)
BASOPHILS # BLD AUTO: 0.3 % (ref 0–1.8)
BASOPHILS # BLD: 0.03 K/UL (ref 0–0.12)
BILIRUB SERPL-MCNC: 0.4 MG/DL (ref 0.1–1.5)
BUN SERPL-MCNC: 16 MG/DL (ref 8–22)
CALCIUM ALBUM COR SERPL-MCNC: 10 MG/DL (ref 8.5–10.5)
CALCIUM SERPL-MCNC: 9.4 MG/DL (ref 8.5–10.5)
CHLORIDE SERPL-SCNC: 99 MMOL/L (ref 96–112)
CO2 SERPL-SCNC: 25 MMOL/L (ref 20–33)
CREAT SERPL-MCNC: 0.58 MG/DL (ref 0.5–1.4)
CRP SERPL HS-MCNC: 7.62 MG/DL (ref 0–0.75)
EOSINOPHIL # BLD AUTO: 0.04 K/UL (ref 0–0.51)
EOSINOPHIL NFR BLD: 0.4 % (ref 0–6.9)
ERYTHROCYTE [DISTWIDTH] IN BLOOD BY AUTOMATED COUNT: 38.4 FL (ref 35.9–50)
ERYTHROCYTE [SEDIMENTATION RATE] IN BLOOD BY WESTERGREN METHOD: 65 MM/HOUR (ref 0–25)
GFR SERPLBLD CREATININE-BSD FMLA CKD-EPI: 98 ML/MIN/1.73 M 2
GLOBULIN SER CALC-MCNC: 4.4 G/DL (ref 1.9–3.5)
GLUCOSE SERPL-MCNC: 111 MG/DL (ref 65–99)
HCT VFR BLD AUTO: 31.9 % (ref 37–47)
HGB BLD-MCNC: 10.5 G/DL (ref 12–16)
IMM GRANULOCYTES # BLD AUTO: 0.06 K/UL (ref 0–0.11)
IMM GRANULOCYTES NFR BLD AUTO: 0.7 % (ref 0–0.9)
INR PPP: 1.16 (ref 0.87–1.13)
LYMPHOCYTES # BLD AUTO: 0.61 K/UL (ref 1–4.8)
LYMPHOCYTES NFR BLD: 6.8 % (ref 22–41)
MCH RBC QN AUTO: 29.1 PG (ref 27–33)
MCHC RBC AUTO-ENTMCNC: 32.9 G/DL (ref 32.2–35.5)
MCV RBC AUTO: 88.4 FL (ref 81.4–97.8)
MONOCYTES # BLD AUTO: 0.52 K/UL (ref 0–0.85)
MONOCYTES NFR BLD AUTO: 5.8 % (ref 0–13.4)
NEUTROPHILS # BLD AUTO: 7.68 K/UL (ref 1.82–7.42)
NEUTROPHILS NFR BLD: 86 % (ref 44–72)
NRBC # BLD AUTO: 0 K/UL
NRBC BLD-RTO: 0 /100 WBC (ref 0–0.2)
PLATELET # BLD AUTO: 415 K/UL (ref 164–446)
PMV BLD AUTO: 8.3 FL (ref 9–12.9)
POTASSIUM SERPL-SCNC: 4 MMOL/L (ref 3.6–5.5)
PROT SERPL-MCNC: 7.7 G/DL (ref 6–8.2)
PROTHROMBIN TIME: 14.8 SEC (ref 12–14.6)
RBC # BLD AUTO: 3.61 M/UL (ref 4.2–5.4)
SODIUM SERPL-SCNC: 136 MMOL/L (ref 135–145)
WBC # BLD AUTO: 8.9 K/UL (ref 4.8–10.8)

## 2025-03-04 PROCEDURE — 96366 THER/PROPH/DIAG IV INF ADDON: CPT

## 2025-03-04 PROCEDURE — 80053 COMPREHEN METABOLIC PANEL: CPT

## 2025-03-04 PROCEDURE — 85652 RBC SED RATE AUTOMATED: CPT

## 2025-03-04 PROCEDURE — 700102 HCHG RX REV CODE 250 W/ 637 OVERRIDE(OP)

## 2025-03-04 PROCEDURE — 96365 THER/PROPH/DIAG IV INF INIT: CPT

## 2025-03-04 PROCEDURE — 85025 COMPLETE CBC W/AUTO DIFF WBC: CPT

## 2025-03-04 PROCEDURE — 96372 THER/PROPH/DIAG INJ SC/IM: CPT

## 2025-03-04 PROCEDURE — 36415 COLL VENOUS BLD VENIPUNCTURE: CPT

## 2025-03-04 PROCEDURE — 86140 C-REACTIVE PROTEIN: CPT

## 2025-03-04 PROCEDURE — 3079F DIAST BP 80-89 MM HG: CPT | Performed by: PSYCHIATRY & NEUROLOGY

## 2025-03-04 PROCEDURE — A9270 NON-COVERED ITEM OR SERVICE: HCPCS

## 2025-03-04 PROCEDURE — 85610 PROTHROMBIN TIME: CPT

## 2025-03-04 PROCEDURE — 700105 HCHG RX REV CODE 258

## 2025-03-04 PROCEDURE — 99212 OFFICE O/P EST SF 10 MIN: CPT | Performed by: PSYCHIATRY & NEUROLOGY

## 2025-03-04 PROCEDURE — 99215 OFFICE O/P EST HI 40 MIN: CPT | Performed by: STUDENT IN AN ORGANIZED HEALTH CARE EDUCATION/TRAINING PROGRAM

## 2025-03-04 PROCEDURE — 99205 OFFICE O/P NEW HI 60 MIN: CPT | Performed by: PSYCHIATRY & NEUROLOGY

## 2025-03-04 PROCEDURE — 85730 THROMBOPLASTIN TIME PARTIAL: CPT

## 2025-03-04 PROCEDURE — 99285 EMERGENCY DEPT VISIT HI MDM: CPT

## 2025-03-04 PROCEDURE — 99417 PROLNG OP E/M EACH 15 MIN: CPT | Performed by: STUDENT IN AN ORGANIZED HEALTH CARE EDUCATION/TRAINING PROGRAM

## 2025-03-04 PROCEDURE — 96375 TX/PRO/DX INJ NEW DRUG ADDON: CPT

## 2025-03-04 PROCEDURE — 770006 HCHG ROOM/CARE - MED/SURG/GYN SEMI*

## 2025-03-04 PROCEDURE — G2212 PROLONG OUTPT/OFFICE VIS: HCPCS | Performed by: PSYCHIATRY & NEUROLOGY

## 2025-03-04 PROCEDURE — 96374 THER/PROPH/DIAG INJ IV PUSH: CPT

## 2025-03-04 PROCEDURE — 700111 HCHG RX REV CODE 636 W/ 250 OVERRIDE (IP)

## 2025-03-04 PROCEDURE — 700111 HCHG RX REV CODE 636 W/ 250 OVERRIDE (IP): Mod: JZ | Performed by: EMERGENCY MEDICINE

## 2025-03-04 PROCEDURE — 3074F SYST BP LT 130 MM HG: CPT | Performed by: PSYCHIATRY & NEUROLOGY

## 2025-03-04 RX ORDER — KETOROLAC TROMETHAMINE 15 MG/ML
15 INJECTION, SOLUTION INTRAMUSCULAR; INTRAVENOUS ONCE
Status: COMPLETED | OUTPATIENT
Start: 2025-03-04 | End: 2025-03-04

## 2025-03-04 RX ORDER — POLYETHYLENE GLYCOL 3350 17 G/17G
1 POWDER, FOR SOLUTION ORAL
Status: DISCONTINUED | OUTPATIENT
Start: 2025-03-04 | End: 2025-03-06 | Stop reason: HOSPADM

## 2025-03-04 RX ORDER — FERROUS SULFATE 325(65) MG
325 TABLET ORAL
COMMUNITY
Start: 2025-02-28

## 2025-03-04 RX ORDER — MAGNESIUM SULFATE HEPTAHYDRATE 40 MG/ML
3 INJECTION, SOLUTION INTRAVENOUS ONCE
Status: COMPLETED | OUTPATIENT
Start: 2025-03-04 | End: 2025-03-04

## 2025-03-04 RX ORDER — ACETAMINOPHEN 500 MG
1000 TABLET ORAL EVERY 8 HOURS PRN
Status: DISCONTINUED | OUTPATIENT
Start: 2025-03-04 | End: 2025-03-06 | Stop reason: HOSPADM

## 2025-03-04 RX ORDER — ACETAMINOPHEN 500 MG
1000 TABLET ORAL EVERY 6 HOURS PRN
Status: DISCONTINUED | OUTPATIENT
Start: 2025-03-09 | End: 2025-03-04

## 2025-03-04 RX ORDER — OXYCODONE HYDROCHLORIDE 5 MG/1
2.5 TABLET ORAL
Refills: 0 | Status: DISCONTINUED | OUTPATIENT
Start: 2025-03-04 | End: 2025-03-06 | Stop reason: HOSPADM

## 2025-03-04 RX ORDER — ACETAMINOPHEN 325 MG/1
650 TABLET ORAL EVERY 8 HOURS PRN
COMMUNITY

## 2025-03-04 RX ORDER — HYDROMORPHONE HYDROCHLORIDE 1 MG/ML
0.25 INJECTION, SOLUTION INTRAMUSCULAR; INTRAVENOUS; SUBCUTANEOUS
Status: DISCONTINUED | OUTPATIENT
Start: 2025-03-04 | End: 2025-03-06 | Stop reason: HOSPADM

## 2025-03-04 RX ORDER — OXYCODONE HYDROCHLORIDE 5 MG/1
5 TABLET ORAL
Refills: 0 | Status: DISCONTINUED | OUTPATIENT
Start: 2025-03-04 | End: 2025-03-06 | Stop reason: HOSPADM

## 2025-03-04 RX ORDER — SODIUM CHLORIDE 9 MG/ML
1000 INJECTION, SOLUTION INTRAVENOUS ONCE
Status: COMPLETED | OUTPATIENT
Start: 2025-03-04 | End: 2025-03-04

## 2025-03-04 RX ORDER — HEPARIN SODIUM 5000 [USP'U]/ML
5000 INJECTION, SOLUTION INTRAVENOUS; SUBCUTANEOUS EVERY 12 HOURS
Status: DISCONTINUED | OUTPATIENT
Start: 2025-03-04 | End: 2025-03-06 | Stop reason: HOSPADM

## 2025-03-04 RX ORDER — AMOXICILLIN 250 MG
2 CAPSULE ORAL EVERY EVENING
Status: DISCONTINUED | OUTPATIENT
Start: 2025-03-04 | End: 2025-03-06 | Stop reason: HOSPADM

## 2025-03-04 RX ORDER — TRAMADOL HYDROCHLORIDE 50 MG/1
50 TABLET ORAL EVERY 8 HOURS PRN
COMMUNITY
Start: 2025-02-28

## 2025-03-04 RX ORDER — ACETAMINOPHEN 500 MG
1000 TABLET ORAL EVERY 6 HOURS
Status: DISCONTINUED | OUTPATIENT
Start: 2025-03-04 | End: 2025-03-04

## 2025-03-04 RX ORDER — LORAZEPAM 0.5 MG/1
0.5 TABLET ORAL ONCE
Status: COMPLETED | OUTPATIENT
Start: 2025-03-04 | End: 2025-03-04

## 2025-03-04 RX ADMIN — OXYCODONE HYDROCHLORIDE 5 MG: 5 TABLET ORAL at 22:12

## 2025-03-04 RX ADMIN — MAGNESIUM SULFATE HEPTAHYDRATE 4 G: 4 INJECTION, SOLUTION INTRAVENOUS at 15:27

## 2025-03-04 RX ADMIN — FENTANYL CITRATE 50 MCG: 50 INJECTION, SOLUTION INTRAMUSCULAR; INTRAVENOUS at 11:44

## 2025-03-04 RX ADMIN — LORAZEPAM 0.5 MG: 0.5 TABLET ORAL at 23:14

## 2025-03-04 RX ADMIN — SENNOSIDES AND DOCUSATE SODIUM 2 TABLET: 50; 8.6 TABLET ORAL at 17:18

## 2025-03-04 RX ADMIN — KETOROLAC TROMETHAMINE 15 MG: 15 INJECTION, SOLUTION INTRAMUSCULAR; INTRAVENOUS at 11:58

## 2025-03-04 RX ADMIN — ACETAMINOPHEN 1000 MG: 500 TABLET ORAL at 15:56

## 2025-03-04 RX ADMIN — HYDROMORPHONE HYDROCHLORIDE 0.25 MG: 1 INJECTION, SOLUTION INTRAMUSCULAR; INTRAVENOUS; SUBCUTANEOUS at 23:13

## 2025-03-04 RX ADMIN — SODIUM CHLORIDE 1000 ML: 9 INJECTION, SOLUTION INTRAVENOUS at 15:22

## 2025-03-04 RX ADMIN — HEPARIN SODIUM 5000 UNITS: 5000 INJECTION, SOLUTION INTRAVENOUS; SUBCUTANEOUS at 17:19

## 2025-03-04 SDOH — ECONOMIC STABILITY: TRANSPORTATION INSECURITY
IN THE PAST 12 MONTHS, HAS THE LACK OF TRANSPORTATION KEPT YOU FROM MEDICAL APPOINTMENTS OR FROM GETTING MEDICATIONS?: NO

## 2025-03-04 SDOH — ECONOMIC STABILITY: TRANSPORTATION INSECURITY
IN THE PAST 12 MONTHS, HAS LACK OF RELIABLE TRANSPORTATION KEPT YOU FROM MEDICAL APPOINTMENTS, MEETINGS, WORK OR FROM GETTING THINGS NEEDED FOR DAILY LIVING?: NO

## 2025-03-04 ASSESSMENT — PAIN DESCRIPTION - PAIN TYPE
TYPE: ACUTE PAIN

## 2025-03-04 ASSESSMENT — COGNITIVE AND FUNCTIONAL STATUS - GENERAL
SUGGESTED CMS G CODE MODIFIER MOBILITY: CJ
HELP NEEDED FOR BATHING: A LITTLE
CLIMB 3 TO 5 STEPS WITH RAILING: A LITTLE
MOBILITY SCORE: 22
DAILY ACTIVITIY SCORE: 22
WALKING IN HOSPITAL ROOM: A LITTLE
SUGGESTED CMS G CODE MODIFIER DAILY ACTIVITY: CJ
DRESSING REGULAR LOWER BODY CLOTHING: A LITTLE

## 2025-03-04 ASSESSMENT — LIFESTYLE VARIABLES
CONSUMPTION TOTAL: NEGATIVE
ON A TYPICAL DAY WHEN YOU DRINK ALCOHOL HOW MANY DRINKS DO YOU HAVE: 0
TOTAL SCORE: 0
TOTAL SCORE: 0
DOES PATIENT WANT TO STOP DRINKING: NO
TOTAL SCORE: 0
EVER HAD A DRINK FIRST THING IN THE MORNING TO STEADY YOUR NERVES TO GET RID OF A HANGOVER: NO
ALCOHOL_USE: NO
HAVE YOU EVER FELT YOU SHOULD CUT DOWN ON YOUR DRINKING: NO
EVER FELT BAD OR GUILTY ABOUT YOUR DRINKING: NO
HAVE PEOPLE ANNOYED YOU BY CRITICIZING YOUR DRINKING: NO
AVERAGE NUMBER OF DAYS PER WEEK YOU HAVE A DRINK CONTAINING ALCOHOL: 0
HOW MANY TIMES IN THE PAST YEAR HAVE YOU HAD 5 OR MORE DRINKS IN A DAY: 0

## 2025-03-04 ASSESSMENT — SOCIAL DETERMINANTS OF HEALTH (SDOH)
WITHIN THE PAST 12 MONTHS, YOU WORRIED THAT YOUR FOOD WOULD RUN OUT BEFORE YOU GOT THE MONEY TO BUY MORE: NEVER TRUE
WITHIN THE PAST 12 MONTHS, THE FOOD YOU BOUGHT JUST DIDN'T LAST AND YOU DIDN'T HAVE MONEY TO GET MORE: NEVER TRUE
IN THE PAST 12 MONTHS, HAS THE ELECTRIC, GAS, OIL, OR WATER COMPANY THREATENED TO SHUT OFF SERVICE IN YOUR HOME?: NO
WITHIN THE LAST YEAR, HAVE YOU BEEN HUMILIATED OR EMOTIONALLY ABUSED IN OTHER WAYS BY YOUR PARTNER OR EX-PARTNER?: NO
WITHIN THE LAST YEAR, HAVE YOU BEEN KICKED, HIT, SLAPPED, OR OTHERWISE PHYSICALLY HURT BY YOUR PARTNER OR EX-PARTNER?: NO
WITHIN THE LAST YEAR, HAVE TO BEEN RAPED OR FORCED TO HAVE ANY KIND OF SEXUAL ACTIVITY BY YOUR PARTNER OR EX-PARTNER?: NO
WITHIN THE LAST YEAR, HAVE YOU BEEN AFRAID OF YOUR PARTNER OR EX-PARTNER?: NO

## 2025-03-04 ASSESSMENT — PATIENT HEALTH QUESTIONNAIRE - PHQ9
CLINICAL INTERPRETATION OF PHQ2 SCORE: 6
SUM OF ALL RESPONSES TO PHQ QUESTIONS 1-9: 25
1. LITTLE INTEREST OR PLEASURE IN DOING THINGS: NOT AT ALL
5. POOR APPETITE OR OVEREATING: 3 - NEARLY EVERY DAY
SUM OF ALL RESPONSES TO PHQ9 QUESTIONS 1 AND 2: 0
2. FEELING DOWN, DEPRESSED, IRRITABLE, OR HOPELESS: NOT AT ALL

## 2025-03-04 ASSESSMENT — FIBROSIS 4 INDEX
FIB4 SCORE: 0.61
FIB4 SCORE: 0.61

## 2025-03-04 NOTE — ASSESSMENT & PLAN NOTE
Patient with intractable, severe, positional headache for the past 3 weeks.  Presented here from her neurologist's office as a direct transfer to the ED, has had multiple ER visits for the same issue.  Associated with worsened headache with lying down flat as well as with looking down.  Features are atypical for migraine.  Given length of time, severity, and atypical characteristics of headache will initiate workup for possible secondary headache in setting of mass lesion vs malignancy vs vasculitis.  Also possible atypical migraine.  - Neurology consulted in ED, appreciate recommendations:   - Completed migraine cocktail including 1 L NS, Tylenol 1 g every 8 hours as needed, magnesium 4 g IV  - MRI brain with and without contrast ordered and showed no acute abnormalities  - MRA brain without contrast ordered and showed no acute abnormalities  - ESR and CRP ordered, CRP elevated at 7.62, ESR elevated at 65  -Per neurology recommendations, will begin indomethacin 3 times daily, will also add on omeprazole 20 mg daily for GI protection.  If patient does not have response to indomethacin, will plan to start Medrol Dosepak.  - As needed low-dose oxycodone 2.5 and 5 mg with breakthrough Dilaudid ordered  - Will add on prn antiemetics if needed

## 2025-03-04 NOTE — LETTER
[facility]  [facility]  [address] [place] [address]  [phone]     March 6, 2025    Patient: Shannan Barry   YOB: 1955   Date of Visit: 3/4/2025       To Whom It May Concern:    Shannan Barry was seen and treated in our department from 3/4-3/6/2025.     Patient to remain off work until able to be seen by primary care provider.     Sincerely,     Domitila Wells M.D.

## 2025-03-04 NOTE — H&P
"    FAMILY MEDICINE HISTORY AND PHYSICAL       PATIENT ID:  NAME:  Shannan Barry  MRN:               [id number]  YOB: 1955    Date of Admission: 3/4/2025     Attending: Pepito Ocampo M.d.     Resident: Domitila Wells M.D.    Primary Care Physician:  Monster Maldonado M.D.    CC:    Chief Complaint   Patient presents with    Headache     X3 weeks, left sided throbbing pain. Patient reports she has been unable to sleep due to pain.     Transfer note stating possible SI. Denies SI to this RN, but \"wants the pain to go away, but would not hurt self\".        HPI: Shannan Barry is a 69 y.o. female with no significant past medical history who presented with intractable positional headache.  She was seen in her outpatient neurologist office on day of admission and sent to the ED for further workup and admission.  Patient reports sudden onset of severe, throbbing, sharp, left-sided headache on 2/10/2025.  States that this same pain has been constant since that time.  Worsens with positional changes, especially lying down flat.  Also reports worsening headache with looking down and leaning forwards.  Has tried Imitrex at home which did not have any impact on the headache.  Also has been taking tramadol as needed at home similarly without change in headache.  The only medication so far that has helped.  Pain is IV fentanyl which she has received in the ED.  Has had multiple ED visits since 2/18 for this pain.  Denies any confusion, weakness, numbness/tingling, joint pains, dysuria, hematuria, nausea vomiting, chest pain, shortness of breath.  Also denies phonophobia, photophobia.  Has no prior history of migraine headaches.  Denies any family history of migraine headaches.     ERCourse:  On arrival to the ED, patient's vital signs were overall stable other than blood pressure elevated at 166/78.  Lab work including CBC, CMP, INR ordered and showed normocytic anemia with hemoglobin 10.5, " stable from prior visits.  CT head from prior ED visit showed no acute intracranial abnormality.  Neurologist on-call consulted from ED.    REVIEW OF SYSTEMS:   Ten systems reviewed and were negative except as noted in the HPI.                PAST MEDICAL HISTORY:   has a past medical history of Hypertension.     PAST SURGICAL HISTORY:   has a past surgical history that includes no pertinent past surgical history.     FAMILY HISTORY:  family history is not on file.     SOCIAL HISTORY:   Living Situation: Lives in [place] with son  Smoking: Denies  Etoh: Denies  Recreational Drug: Denies    DIET:   Orders Placed This Encounter   Procedures    Diet Order Diet: Regular     Standing Status:   Standing     Number of Occurrences:   1     Diet::   Regular [1]       ALLERGIES:  No Known Allergies    OUTPATIENT MEDICATIONS:    Current Facility-Administered Medications:     magnesium sulfate IVPB premix 4 g, 4 g, Intravenous, Once, Domitila Wells M.D., Last Rate: 25 mL/hr at 03/04/25 1527, 4 g at 03/04/25 1527    acetaminophen (Tylenol) tablet 1,000 mg, 1,000 mg, Oral, Q8HRS PRN, Domitila Wells M.D., 1,000 mg at 03/04/25 1556    heparin injection 5,000 Units, 5,000 Units, Subcutaneous, Q12HRS, Domitila Wells M.D.    senna-docusate (Pericolace Or Senokot S) 8.6-50 MG per tablet 2 Tablet, 2 Tablet, Oral, Q EVENING **AND** polyethylene glycol/lytes (Miralax) Packet 1 Packet, 1 Packet, Oral, QDAY PRN, Domitila Wells M.D.    Pharmacy Consult Request ...Pain Management Review 1 Each, 1 Each, Other, PHARMACY TO DOSE, Domitila Wells M.D.    oxyCODONE immediate-release (Roxicodone) tablet 2.5 mg, 2.5 mg, Oral, Q3HRS PRN **OR** oxyCODONE immediate-release (Roxicodone) tablet 5 mg, 5 mg, Oral, Q3HRS PRN **OR** HYDROmorphone (Dilaudid) injection 0.25 mg, 0.25 mg, Intravenous, Q3HRS PRN, Domitila Wells M.D.    PHYSICAL EXAM:  Vitals:    03/04/25 1206 03/04/25 1308 03/04/25 1330 03/04/25 1503   BP: 130/67 139/74  113/72   Pulse:  78 87  70   Resp: 14 18 18 16   Temp:  37.3 °C (99.2 °F)  37.7 °C (99.8 °F)   TempSrc:  Temporal  Temporal   SpO2: 94% 96%  98%   Weight:       Height:       , Temp (24hrs), Av °C (98.6 °F), Min:35.9 °C (96.7 °F), Max:37.7 °C (99.8 °F)  , Pulse Oximetry: 98 %, O2 (LPM): 0, O2 Delivery Device: None - Room Air    Physical Exam  Constitutional:       General: She is not in acute distress.     Appearance: Normal appearance. She is normal weight. She is not toxic-appearing.   HENT:      Head: Normocephalic and atraumatic.      Mouth/Throat:      Mouth: Mucous membranes are moist.      Pharynx: Oropharynx is clear. No oropharyngeal exudate or posterior oropharyngeal erythema.   Eyes:      Extraocular Movements: Extraocular movements intact.      Conjunctiva/sclera: Conjunctivae normal.      Pupils: Pupils are equal, round, and reactive to light.   Cardiovascular:      Rate and Rhythm: Normal rate and regular rhythm.      Pulses: Normal pulses.      Heart sounds: Normal heart sounds.   Pulmonary:      Effort: Pulmonary effort is normal. No respiratory distress.      Breath sounds: Normal breath sounds. No wheezing or rales.   Abdominal:      General: Abdomen is flat. Bowel sounds are normal.      Palpations: Abdomen is soft.   Skin:     General: Skin is warm and dry.   Neurological:      General: No focal deficit present.      Mental Status: She is alert and oriented to person, place, and time.      Cranial Nerves: No cranial nerve deficit.      Sensory: No sensory deficit.      Motor: No weakness.      Gait: Gait normal.      Comments: Strength and sensation intact in bilateral upper and lower extremities.       LAB TESTS:   Recent Labs     25  1154   WBC 9.8 8.9   RBC 3.86* 3.61*   HEMOGLOBIN 11.0* 10.5*   HEMATOCRIT 35.0* 31.9*   MCV 90.7 88.4   MCH 28.5 29.1   MCHC 31.4* 32.9   RDW 39.4 38.4   PLATELETCT 480* 415   MPV 8.4* 8.3*     Recent Labs     25  1154   SODIUM 133*  "136   POTASSIUM 4.1 4.0   CHLORIDE 98 99   CO2 24 25   GLUCOSE 95 111*   BUN 14 16   CREATININE 0.51 0.58   CALCIUM 9.3 9.4     Recent Labs     03/01/25 2008 03/04/25  1154   ALTSGPT 12 10   ASTSGOT 15 18   ALKPHOSPHAT 111* 101*   TBILIRUBIN 0.4 0.4   GLUCOSE 95 111*     Recent Labs     03/04/25  1154   APTT 30.7   INR 1.16*     No results for input(s): \"NTPROBNP\" in the last 72 hours.      No results for input(s): \"TROPONINT\" in the last 72 hours.    CULTURES:   Results       ** No results found for the last 168 hours. **          IMAGES:  MR-BRAIN-WITH & W/O    (Results Pending)   MR-MRA HEAD-W/O    (Results Pending)     CONSULTS:   Neurology, Dr. Vasquez    ASSESSMENT/PLAN:   69 y.o. female admitted for severe, intractable, positional headache per outpatient neurologist, here for brain imaging to rule out secondary headache in setting of mass lesion or vasculitis vs atypical migraine.     I anticipate this patient will require at least two midnights for appropriate medical management, necessitating inpatient admission because workup of possible secondary headache with brain imaging, possible need for further workup with LP pending results    * Acute intractable headache, unspecified headache type- (present on admission)  Assessment & Plan  Patient with intractable, severe, positional headache for the past 3 weeks.  Presented here from her neurologist's office as a direct transfer to the ED, has had multiple ER visits for the same issue.  Associated with worsened headache with lying down flat as well as with looking down.  Features are atypical for migraine.  Given length of time, severity, and atypical characteristics of headache will initiate workup for possible secondary headache in setting of mass lesion vs malignancy vs vasculitis.  Also possible atypical migraine.  - Neurology consulted in ED, appreciate recommendations:   - Will order migraine cocktail including 1 L NS, Tylenol 1 g every 8 hours as " needed, magnesium 4 g IV  - MRI brain with and without contrast ordered, pending  - MRA brain without contrast ordered, pending  - ESR and CRP ordered, CRP elevated at 7.62, ESR pending  - As needed low-dose oxycodone 2.5 and 5 mg with breakthrough Dilaudid ordered  - Will add on prn antiemetics if needed      Core Measures:  Fluids: P.o.  Lines: PIV  Abx: None  Diet: Regular  PPX: SCDs/TEDs and heparin ppx    CODE Status: Full Code    Domitila Wells M.D.  PGY-2  [facility]

## 2025-03-04 NOTE — ED NOTES
Report given to Tanesha RN.  Pt to S632-2, on room air in stable condition with transport, all belongings with pt. Son at bedside

## 2025-03-04 NOTE — PROGRESS NOTES
[facility] Headache Program  New Patient Visit      Patient's Name: Shannan Barry  YOB: 1955  MRN: [id number]  Date of Service: 03/04/25.    Referring Provider: Liban Melton M.D.  [address] [facility]  [address]  [place] [address]    Chief Concern: Headaches.     The patient presents with her son, and provides verbal consent for him to be present and provide additional information as necessary.  This visit was conducted with the help of a formal .    HPI: The patient is a 69 y.o., right-handed female, who initially presented to my headache clinic for evaluation of severe headache on 3/4/20/2025.    The patient has a new onset headache as of early 2025, but the exact details varied between information obtained today and over the 3 ER visits over the last month or so.    During the visit with me today, the patient shared that she never had headaches before mid February 2025.  The headache started suddenly, and was severe from the beginning, and it never fully resolved.  The headache is described as severe, left parietal region head pain and tenderness.  The quality is pounding and when more severe stabbing.  She has no photophobia, no phonophobia, and she vomited for the first time on 3/3/2025 in context of this headache.    If the headache is exquisitely severe, the patient is not able to lay down due to worsening of her headache, but when the headache is at a severity of 6-8, she is able to lay down.  The patient also shared that the headache is worse when she looks down.    She denies neck pain.  She denies any focal neurological symptoms, such as weakness/numbness/changes in speech.  No fevers.  No tenderness in the temporal regions.  No ear pain.    These headaches are so severe, that she is not able to sleep, and she lost 5 pounds over the last couple weeks, though she has always been slim.    The patient was prescribed Toradol, as well as  "tramadol, but these would only transiently and partially helped.  Imitrex was prescribed as outpatient, and did not help at all.    The patient wished to die due to these headaches, but no actual plan to harm herself.     Pertinent Ancillary Test Results:    MRI brain studies:   - MRI brain - none available for my review.     CT head studies:   - CT head wo contrast (02/18/2025 at [facility]): Unremarkable.      - CT venogram head (03/01/2025 at [facility]): \"IMPRESSION:   1.  No acute intracranial abnormality.  2.  Dural venous sinuses enhance normally.  3.  No mass demonstrated.\"    Current Acute Headache Treatment Medications: Toradol during ER visits. Tramadol PRN.     Previously Acute Headache Treatment Medications: Sumatriptan (ineffective).     Current Headache Preventative Medications: None.     Previously Headache Preventative Medications: None     Review of Systems: No recent fevers. She lost 5 lbs in the interim. The mood is overall stable. No SI/HI.     Past Medical History:  Past Medical History:   Diagnosis Date    Hypertension      Past Surgical History:  Past Surgical History:   Procedure Laterality Date    NO PERTINENT PAST SURGICAL HISTORY        Social History:  Social History     Tobacco Use    Smoking status: Never    Smokeless tobacco: Never   Vaping Use    Vaping status: Never Used   Substance Use Topics    Alcohol use: No    Drug use: No     Family History:  There is no known family history of migraine headaches        3/4/2025     9:20 AM   PHQ-9 Screening   Little interest or pleasure in doing things 3 - nearly every day   Feeling down, depressed, or hopeless 3 - nearly every day   Trouble falling or staying asleep, or sleeping too much 3 - nearly every day   Feeling tired or having little energy 3 - nearly every day   Poor appetite or overeating 3 - nearly every day   Feeling bad about yourself - or that you are a failure or have let yourself or your family down 3 - nearly every day   Trouble " "concentrating on things, such as reading the newspaper or watching television 3 - nearly every day   Moving or speaking so slowly that other people could have noticed. Or the opposite - being so fidgety or restless that you have been moving around a lot more than usual 1 - several days   Thoughts that you would be better off dead, or of hurting yourself in some way 3 - nearly every day   PHQ-2 Total Score 6   PHQ-9 Total Score 25       [place] Suicide Severity Rating Scale   Wish to be Dead?: Yes  Suicidal Thoughts: No    Suicidal Thoughts with Method Without Specific Plan or Intent to Act:    Suicidal Intent Without Specific Plan:    Suicide Intent with Specific Plan:    Suicide Behavior Question: No  How long ago did you do any of these?:    C-SSRS Risk Level: Low Risk    Additional Suicide Screening Questions    Suspected or Confirmed Suicide Attempted?: No  Harming or killing others?: No    Allergies:  No Known Allergies    Current Medications:    Current Outpatient Medications:     FeroSul, 325 mg, Oral, Q3 DAYS, Taking    traMADol, 50 mg, Oral, Q8HRS PRN, PRN    SUMAtriptan, 50 mg, Oral, Once PRN, PRN    SUMAtriptan, 50 mg, Oral, Once PRN, PRN    naproxen, 500 mg, Oral, BID WITH MEALS, Taking    Diclofenac Sodium, Apply to right knee and hip TID prn pain (Patient not taking: Reported on 3/4/2025), Not Taking    Physical Examination:    Ambulatory Vitals  Encounter Vitals  Temperature: 36.9 °C (98.5 °F)  Temp src: Temporal  Blood Pressure : 124/80  Pulse: 83  Respiration: 16  Pulse Oximetry: 97 %  Weight: 43.1 kg (95 lb)  Height: 162.6 cm (5' 4\")  BMI (Calculated): 16.31    Neurological Examination:  Mental Status: The patient is alert and oriented to person, place, time, and situation. Speech is fluent, with no aphasia nor dysarthria noted. Affect is normal.    Cranial Nerve Examination:  CN I: Olfaction examination is deferred.  CN II: Visual fields are full to confrontation examination and show no visual " field defect.   CN III, IV, VI: Eye movements are normal in all directions. Pupils are reactive to direct and consensual light. There is no relative afferent pupillary defect. There is no nystagmus.  CN V: Facial sensation to light touch is intact throughout.   CN VII: No significant facial muscle or other soft tissue asymmetry.  CN VIII: Hearing intact to rubbing sounds bilaterally.   CN IX, X: Soft palate elevates symmetrically.  CN XI: Symmetrical shoulder shrug exam.  CN XII: Midline tongue protrusion and moves symmetrically to each side.     Motor Examination:  Muscle strength is intact throughout. Muscle tone is normal throughout. No abnormal movements are observed. No pronator drift is noted.     Muscle Stretch Reflexes Examination:  Deferred.    Sensory Examination:  Preserved sensation to light touch in all extremities.     Coordination:  Normal finger to nose testing bilaterally, no postural nor intentional tremor was noted.     Stance/gait:  Normal regular gait with normal arm swings and stride length. Mild difficulties with tandem gait. Romberg sign is absent.     ASSESSMENT AND PLAN:  1. Depression, unspecified depression type  - Patient has been identified as having a positive depression screening. Appropriate orders and counseling have been given.    2. Acute intractable headache, unspecified headache type  The patient has an acute headache syndrome, which is unclear if it is primary or secondary.  I had a detailed discussion with the patient, her son, as well as our inpatient neurology consultant, and we agreed to proceed with the ER admission, due to intractable headaches, patient's inability to sleep, and her suicidal ideation due to severe pain.  A workup to be considered is MRI brain with and without contrast, imaging of cervical and intracranial arterial system, as well as consideration for lumbar puncture.    - once the workup is completed, and if no secondary causes are found, we will further  discuss headache preventative treatment and acute headache treatment.   - a significant amount of time was spent coordinating patient's care.     Follow up in 3 months.     My total time spent caring for the patient on the day of the encounter was 78 minutes.   This does not include time spent on separately billable procedures/tests.      Kevin Yanes MD  Outpatient Neurology   [facility]

## 2025-03-04 NOTE — PROGRESS NOTES
4 Eyes Skin Assessment Completed by RADHA Almendarez and RADHA Mccauley.    Head WDL  Ears WDL  Nose WDL  Mouth WDL  Neck WDL  Breast/Chest WDL  Shoulder Blades WDL  Spine WDL  (R) Arm/Elbow/Hand WDL  (L) Arm/Elbow/Hand WDL  Abdomen WDL  Groin WDL  Scrotum/Coccyx/Buttocks WDL  (R) Leg WDL  (L) Leg WDL  (R) Heel/Foot/Toe Redness and Blanching, bunion  (L) Heel/Foot/Toe Redness and Blanching, bunion          Devices In Places N/A      Interventions In Place Pillows    Possible Skin Injury No    Pictures Uploaded Into Epic N/A  Wound Consult Placed N/A  RN Wound Prevention Protocol Ordered No

## 2025-03-04 NOTE — CONSULTS
[facility]   DEPARTMENT OF NEUROLOGY    INITIAL ENCOUNTER     MRN: [id number]  Patient seen at the request of: Dr. Dheeraj Mijares M.D.  Chief Complaint/Reason for Consult:  headache    IMPRESSION & RECOMMENDATIONS:   Primary vs. Secondary headache disorder. Rule out secondary causes via further imaging as recommended below. Low suspicion for space occupying lesion or RCVS given lack of associated neurological deficits with headache despite clear positional component. Headache without clear migrainous features though migraine still in the differential diagnosis. Consider nummular headache as well.     - MRI Brain w/wo  - MRA Brain w/o  - ESR/CRP, serum  - Outpatient neurology f/u  1. Recommend the following migraine cocktail:  - 1L NS bolus  - Mg 3 g IV piggyback x1 dose  - Tylenol 1g q8 hours PRN    2. If patient's HA improves, recommend discharging home with:  - Magnesium oxide 400mg QD   - Tylenol 650mg q6mg PRN  - Naproxen 200-400mg q12 PRN  Encouraged headache hygiene, including but not limited to: sleeping well, frequent exercise, and a healthy diet.       We will continue to follow.     Signed:  Eliceo Vasquez DO  Department of Neurology  [facility]    HISTORY OF PRESENT ILLNESS:   Shannan Barry is an 69 y.o. right-handed female who presented to the ED with episodic headaches. History was obtained from patient.     Headaches started three weeks ago, was working as a house keeper. Initially severity was about a 3/10, and involved the left parietal region. No associated neurological symptoms, and headache resolved after approximately 30 minutes. Approximately one week later she presented to the ED with a headache, and states it was maximal intensity 10/10 at onset, same location, and the quality was throbbing/pulsating. This lead to the 2/18 presentation noted at [facility]. She received toradol, which she states helped, but did not resolve her headache  "and it continued into the next day and this has continued to this day, with fluctuating severity but never resolves. States the headache is limited to the left parietal region, in a discrete fashion, without radiation. Per chart review, \"Daughter reports that patient saw her PCP and was prescribed Imitrex. However, the Imitrex did not help her headache and so she decided not to take it.\".     After receiving toradol in the ED here her headache severity is a 5/10.     Current migraine headache:  - Aura: none  - Worse with lying flat, unsure about coughing, not worse with bearing down  - No recent changes in sleep, diet, or exercise regimen  - Interfering with ADLs  - No nausea, no vomiting, no light/sound sensitivity   - Associated autonomic features: none     Systemic symptoms: denies fever, fatigue, myalgias, weight loss, neck pain  Associated neuro signs: denies changes in personality, LOC, AMS, weakness/numbness/tingling, vision changes.    PAST MEDICAL HISTORY:     Active Ambulatory Problems     Diagnosis Date Noted    No Active Ambulatory Problems     Resolved Ambulatory Problems     Diagnosis Date Noted    No Resolved Ambulatory Problems     Past Medical History:   Diagnosis Date    Hypertension         PAST SURGICAL HISTORY:     Past Surgical History:   Procedure Laterality Date    NO PERTINENT PAST SURGICAL HISTORY         CURRENT MEDICATIONS:     No current facility-administered medications for this encounter.     Current Outpatient Medications   Medication Sig Dispense Refill    FEROSUL 325 (65 Fe) MG tablet Take 325 mg by mouth every 3 days.      traMADol (ULTRAM) 50 MG Tab Take 50 mg by mouth every 8 hours as needed for Moderate Pain.      SUMAtriptan (IMITREX) 50 MG Tab Take 1 Tablet by mouth one time as needed for Migraine for up to 1 dose. 10 Tablet 3    SUMAtriptan (IMITREX) 50 MG Tab Take 50 mg by mouth one time as needed for Migraine. Indications: Migraine Headache      naproxen (NAPROSYN) 500 MG " Tab Take 1 Tablet by mouth 2 times a day with meals. 60 Tablet 0    Diclofenac Sodium 1 % Gel Apply to right knee and hip TID prn pain (Patient not taking: Reported on 3/4/2025) 100 g 0       ALLERGIES:   No Known Allergies     SOCIAL HISTORY:   Does not smoke or drinks  , with one daughter, one son    FAMILY HISTORY:   History reviewed. No pertinent family history.   Diabetes    REVIEW OF SYSTEMS:   For the following ROS, pertinent positives are in bold; pertinent negatives are in italics.  CONSTITUTIONAL: fevers, chills, sweats, weight loss, fatigue  EYES: vision changes, double vision, blurring  ENMT: hearing loss, tinnitus, epistaxis, sores, voice changes, sore throat  CV: chest pain, dyspnea, palpitations, orthopnea, lower extremity edema  RESP: cough, sputum, dyspnea, hemoptysis, pleuritic pain  GI: abdominal pain, nausea, vomiting, hematemesis, diarrhea, constipation, BRBPR, melena  : hematuria, dysuria, frequency, urgency  MSKL: pain, muscle weakness, joint pain, joint swelling, decreased ROM  SKIN: rash, pain, pruritis, lesions, lumps, skin breakdown  NEURO: See HPI  PSYCH: depression, anxiety, suicidal ideation, homicidal ideation     PHYSICAL EXAM:     Vitals:    03/04/25 1126   BP: (!) 173/80   Pulse: 81   Resp: 15   Temp:    SpO2: 97%       NEUROLOGIC EXAM    Mental Status:  Level of alertness: Awake  Orientation: Oriented to self, location, time & situation  Attention:  Able to perform serial 7s, able to spell WORLD forwards and backwards  Speech: Normal quality, quantity, rate, volume and prosody  Language: Fluent, no aphasia or paraphasic errors, normal semantic fluency  Short Term Memory: Intact to 5 objects after 5 minutes.   Higher Cognitive Function: No apraxia, no finger agnosia  Fund of Knowledge: Knows current president and , able to name 3 current events    Cranial Nerves:   Pupils equally round 3 mm and reactive to light 2 mm  Extraocular movements intact without  "nystagmus  Full visual fields bilaterally to confrontation  Facial sensation to light touch intact bilaterally over V1-V3  No facial asymmetry  Hearing intact   No dysarthria  Tongue and palate midline  Shoulder shrug and neck turn intact.    Motor: Normal bulk and tone  No fasciculations. No spasticity. No cogwheel. No pronator drift. Sustained Agx4     Reflexes:    Root Right Left Comment   biceps C5 2 2     brachioradialis C6 2 2     triceps C7 2 2     patella L3 (L4) 2 2     achilles S1 (S2) 2 2     jaw jerk CNV * *     pecs C5-T1 * *     magana C8-T1 neg neg     crossed adductors L2-L4 * *     plantar response S1-S2 Flexor Flexor     *Not tested    Sensory:   SILT x4    Coordination: No dysmetria on finger to nose or heel to shin    Gait: Deferred       DATA:     Labs  Lab Results   Component Value Date    GLUCOSE 95 03/01/2025    BUN 14 03/01/2025    CO2 24 03/01/2025     Lab Results   Component Value Date/Time    WBC 9.8 03/01/2025 2008    MCV 90.7 03/01/2025 2008      No results found for: \"TSH\"  No components found for: \"OMYFEJ3QS2\", \"H7EYBMQ\", \"T4AUTO\", \"L0GOIRK\"  No components found for: \"HGBA1C\"  Lab Results   Component Value Date    HDL 79 08/16/2019    LDL 95 08/16/2019     No results found for: \"JRUK00FZ\"  No results found for: \"FERRITIN\", \"FOLATE\"  No results found for: \"PT\", \"INR\"  No results found for: \"MHYHLCCY90\", \"RPR\"  Calcium   Date Value Ref Range Status   03/01/2025 9.3 8.4 - 10.2 mg/dL Final     Albumin   Date Value Ref Range Status   03/01/2025 3.2 3.2 - 4.9 g/dL Final     Hemoglobin   Date Value Ref Range Status   03/01/2025 11.0 (L) 12.0 - 16.0 g/dL Final     @lastb12@    Imaging:   Images were personally reviewed by me.     Total time spent: 60 minutes    "

## 2025-03-04 NOTE — ED NOTES
Pharmacy Medication Reconciliation      ~Medication reconciliation updated and complete per patient & son interpreting at bedside  ~Allergies have been verified and updated   ~No oral ABX within the last 30 days  ~Is dispense history available: yes   ~[facility] pharmacy :  [facility] [phone]      ~Anticoagulants (rivaroxaban, apixaban, edoxaban, dabigatran, warfarin, enoxaparin) taken in the last 14 days? No

## 2025-03-04 NOTE — LETTER
[facility]  DISCHARGE LOUNGE  [address] [facility] [facility] [address]  [phone]     March 6, 2025    Patient: Shannan Barry   YOB: 1955   Date of Visit: 3/4/2025       To Whom It May Concern:    Shannan Barry was seen and treated in our department from 3/4-3/6/2025.     Patient to remain off of work until able to be seen by primary care doctor.     Sincerely,     Domitila Wells M.D.

## 2025-03-04 NOTE — PATIENT INSTRUCTIONS
Please keep the diary of your headaches.    Please keep the diary of provoking factors for your headaches (e.g. alcohol, certain foods, etc).     Please take riboflavin (vitamin B2) 400 mg daily and magnesium oxide 400 mg daily for headache prevention (over-the-counter).    Please let our office know if you experience any changes in your neurological status and/or any changes in the nature of your headaches.

## 2025-03-04 NOTE — ED TRIAGE NOTES
"Chief Complaint   Patient presents with    Headache     X3 weeks, left sided throbbing pain. Patient reports she has been unable to sleep due to pain.     Transfer note stating possible SI. Denies SI to this RN, but \"wants the pain to go away, but would not hurt self\".      Pt ambulatory to triage for above complaint.      Pt is alert/oriented and follows commands. Pt speaking in full sentences and responds appropriately to questions. No acute distress noted in triage and respirations are even and unlabored.     Pt placed in lobby and educated on triage process. Pt encouraged to alert staff for any changes in condition.   "

## 2025-03-04 NOTE — ED PROVIDER NOTES
"ED Provider Note    CHIEF COMPLAINT  Chief Complaint   Patient presents with    Headache     X3 weeks, left sided throbbing pain. Patient reports she has been unable to sleep due to pain.     Transfer note stating possible SI. Denies SI to this RN, but \"wants the pain to go away, but would not hurt self\".        HPI/ROS    Shannan Barry is a 69 y.o. female who presents with an intractable headache.  The patient's been having this headache for about 3 weeks.  She is been seen in the emerged department a couple of times as well as her primary care provider.  She was seen by the neurologist today and sent here for further inpatient workup for the intractable headache.  The patient has not had any associated vomiting.  She states the pain is so severe that she would rather die than continue to live with the discomfort.  She has not had any suicidal problems in the past.  She does have a known history of hypertension.  She has not had any associated fevers.    PAST MEDICAL HISTORY   has a past medical history of Hypertension.    SURGICAL HISTORY   has a past surgical history that includes no pertinent past surgical history.    FAMILY HISTORY  History reviewed. No pertinent family history.    SOCIAL HISTORY  Social History     Tobacco Use    Smoking status: Never    Smokeless tobacco: Never   Vaping Use    Vaping status: Never Used   Substance and Sexual Activity    Alcohol use: No    Drug use: No    Sexual activity: Not on file       CURRENT MEDICATIONS  Home Medications       Reviewed by Nury Almanzar R.N. (Registered Nurse) on 03/04/25 at 1108  Med List Status: Partial     Medication Last Dose Status   Diclofenac Sodium 1 % Gel  Active   FEROSUL 325 (65 Fe) MG tablet  Active   naproxen (NAPROSYN) 500 MG Tab  Active   SUMAtriptan (IMITREX) 50 MG Tab  Active   SUMAtriptan (IMITREX) 50 MG Tab  Active   traMADol (ULTRAM) 50 MG Tab  Active                    ALLERGIES  No Known Allergies    PHYSICAL " "EXAM  VITAL SIGNS: BP (!) 173/80   Pulse 81   Temp 35.9 °C (96.7 °F) (Temporal)   Resp 15   Ht 1.626 m (5' 4\")   Wt 44.2 kg (97 lb 7.1 oz)   SpO2 97%   BMI 16.73 kg/m²    General The patient appears uncomfortable but nontoxic    Head is normocephalic atraumatic, ears TMs intact with erythema, nares and mouth are moist    Eyes pupils are 2 and reactive bilaterally    Pulmonary the patient's lungs are clear auscultation bilaterally    Cardiovascular S1-S2 with a regular rate and rhythm    GI abdomen soft    Skin no rashes, pallor, no jaundice    Extremities atraumatic    Neurologic examination is grossly intact      COURSE & MEDICAL DECISION MAKING    This is a 69-year-old female who presents the emerged part with an intractable headache.  I did speak with neurology and they recommend further inpatient treatment and workup due to the severity of the patient's pain.  She will receive Toradol as well as fentanyl for pain control.  The patient will be admitted to the hospitalist for further workup including an MRI with and without contrast.  General neurology will consult on the case.    FINAL DIAGNOSIS  Intractable headache    Disposition  The patient will be admitted in stable condition     Electronically signed by: Dheeraj Mijares M.D., 3/4/2025 11:37 AM      "

## 2025-03-04 NOTE — LETTER
[facility]  DISCHARGE LOUNGE  [address]  [place] [address]  [phone]     March 6, 2025    Patient: Shannan Barry   YOB: 1955   Date of Visit: 3/4/2025       To Whom It May Concern:    Shannan Barry was seen and treated in our department from 3/4-3/6/2025.     Patient to remain off of work until able to be seen by her primary care provider.     Sincerely,     Domitila Wells M.D.

## 2025-03-04 NOTE — ED NOTES
Patient brought back to room and changing into gown, son bedside. Primary RN notified that pt is in room.

## 2025-03-05 LAB
ERYTHROCYTE [DISTWIDTH] IN BLOOD BY AUTOMATED COUNT: 40.1 FL (ref 35.9–50)
HCT VFR BLD AUTO: 33.1 % (ref 37–47)
HGB BLD-MCNC: 10.4 G/DL (ref 12–16)
MCH RBC QN AUTO: 28.4 PG (ref 27–33)
MCHC RBC AUTO-ENTMCNC: 31.4 G/DL (ref 32.2–35.5)
MCV RBC AUTO: 90.4 FL (ref 81.4–97.8)
PLATELET # BLD AUTO: 415 K/UL (ref 164–446)
PMV BLD AUTO: 8.5 FL (ref 9–12.9)
RBC # BLD AUTO: 3.66 M/UL (ref 4.2–5.4)
WBC # BLD AUTO: 9.2 K/UL (ref 4.8–10.8)

## 2025-03-05 PROCEDURE — 700117 HCHG RX CONTRAST REV CODE 255: Mod: JZ

## 2025-03-05 PROCEDURE — 96376 TX/PRO/DX INJ SAME DRUG ADON: CPT

## 2025-03-05 PROCEDURE — 96372 THER/PROPH/DIAG INJ SC/IM: CPT

## 2025-03-05 PROCEDURE — 99232 SBSQ HOSP IP/OBS MODERATE 35: CPT | Performed by: STUDENT IN AN ORGANIZED HEALTH CARE EDUCATION/TRAINING PROGRAM

## 2025-03-05 PROCEDURE — 700111 HCHG RX REV CODE 636 W/ 250 OVERRIDE (IP)

## 2025-03-05 PROCEDURE — 700102 HCHG RX REV CODE 250 W/ 637 OVERRIDE(OP)

## 2025-03-05 PROCEDURE — A9579 GAD-BASE MR CONTRAST NOS,1ML: HCPCS | Mod: JZ

## 2025-03-05 PROCEDURE — 70553 MRI BRAIN STEM W/O & W/DYE: CPT

## 2025-03-05 PROCEDURE — 85027 COMPLETE CBC AUTOMATED: CPT

## 2025-03-05 PROCEDURE — A9270 NON-COVERED ITEM OR SERVICE: HCPCS

## 2025-03-05 PROCEDURE — 70544 MR ANGIOGRAPHY HEAD W/O DYE: CPT

## 2025-03-05 PROCEDURE — G0378 HOSPITAL OBSERVATION PER HR: HCPCS

## 2025-03-05 RX ORDER — OMEPRAZOLE 20 MG/1
20 CAPSULE, DELAYED RELEASE ORAL DAILY
Status: DISCONTINUED | OUTPATIENT
Start: 2025-03-05 | End: 2025-03-06 | Stop reason: HOSPADM

## 2025-03-05 RX ORDER — INDOMETHACIN 25 MG/1
25 CAPSULE ORAL
Status: DISCONTINUED | OUTPATIENT
Start: 2025-03-05 | End: 2025-03-05

## 2025-03-05 RX ORDER — KETOROLAC TROMETHAMINE 15 MG/ML
15 INJECTION, SOLUTION INTRAMUSCULAR; INTRAVENOUS ONCE
Status: COMPLETED | OUTPATIENT
Start: 2025-03-05 | End: 2025-03-05

## 2025-03-05 RX ORDER — INDOMETHACIN 50 MG/1
50 CAPSULE ORAL
Status: DISCONTINUED | OUTPATIENT
Start: 2025-03-05 | End: 2025-03-06 | Stop reason: HOSPADM

## 2025-03-05 RX ADMIN — SENNOSIDES AND DOCUSATE SODIUM 2 TABLET: 50; 8.6 TABLET ORAL at 17:55

## 2025-03-05 RX ADMIN — HEPARIN SODIUM 5000 UNITS: 5000 INJECTION, SOLUTION INTRAVENOUS; SUBCUTANEOUS at 05:21

## 2025-03-05 RX ADMIN — GADOTERIDOL 10 ML: 279.3 INJECTION, SOLUTION INTRAVENOUS at 00:06

## 2025-03-05 RX ADMIN — OMEPRAZOLE 20 MG: 20 CAPSULE, DELAYED RELEASE ORAL at 16:09

## 2025-03-05 RX ADMIN — KETOROLAC TROMETHAMINE 15 MG: 15 INJECTION, SOLUTION INTRAMUSCULAR; INTRAVENOUS at 09:30

## 2025-03-05 RX ADMIN — HEPARIN SODIUM 5000 UNITS: 5000 INJECTION, SOLUTION INTRAVENOUS; SUBCUTANEOUS at 17:58

## 2025-03-05 RX ADMIN — ACETAMINOPHEN 1000 MG: 500 TABLET ORAL at 00:52

## 2025-03-05 RX ADMIN — INDOMETHACIN 50 MG: 50 CAPSULE ORAL at 18:13

## 2025-03-05 RX ADMIN — ACETAMINOPHEN 1000 MG: 500 TABLET ORAL at 16:13

## 2025-03-05 ASSESSMENT — PAIN DESCRIPTION - PAIN TYPE
TYPE: ACUTE PAIN

## 2025-03-05 ASSESSMENT — PATIENT HEALTH QUESTIONNAIRE - PHQ9
2. FEELING DOWN, DEPRESSED, IRRITABLE, OR HOPELESS: NOT AT ALL
1. LITTLE INTEREST OR PLEASURE IN DOING THINGS: NOT AT ALL
SUM OF ALL RESPONSES TO PHQ9 QUESTIONS 1 AND 2: 0

## 2025-03-05 NOTE — CONSULTS
[facility]   DEPARTMENT OF NEUROLOGY    FOLLOW UP ENCOUNTER     MRN: [id number]  Patient seen at the request of: Dr. Dheeraj Mijares M.D.  Chief Complaint/Reason for Consult:  headache    INTERVAL EVENTS:   - headache severity greatly improved as of this morning. Once again reporting significant improvement in pain with toradol.   - esr/crp significantly elevated, even after being age adjusted  - MRI Brain w/wo and MRA studies have been largely underwhelming.   - No new focal neurological deficits.     IMPRESSION & RECOMMENDATIONS:   New onset headache without migrainous features. Suspect this is a primary headache disorder, secondary culprits have largely been rule out with extensive CNS imaging. Though inflammatory markers are significantly elevated, they are non-specific, and my suspicion for GCA is fairly low given the lack of accompanying symptoms: no jaw claudication, temporal tenderness, and PMR symptoms. Headache without clear migrainous features though migraine still in the differential diagnosis. Consider nummular headache as well. Hemicrania continua is also in the differential though lack of autonomic cranial features make this far less likely, though patient's response to NSAIDs raise the question of whether a indomethacin trial may be helpful here.     - indomethacin trial with 25 mg every eight hours; can be further titrated up or down based on clinical course.   - can consider medrol dose zena if no response to indomethacin  - outpatient neurology follow up    Neurology will sign off at this time. Please reach back out to us with any questions or concerns.    Signed:  Eliceo Vasquez DO  Department of Neurology  [facility]    HISTORY OF PRESENT ILLNESS:   Shannan Barry is an 69 y.o. right-handed female who presented to the ED with episodic headaches. History was obtained from patient.     Headaches started three weeks ago, was working as a house  "keeper. Initially severity was about a 3/10, and involved the left parietal region. No associated neurological symptoms, and headache resolved after approximately 30 minutes. Approximately one week later she presented to the ED with a headache, and states it was maximal intensity 10/10 at onset, same location, and the quality was throbbing/pulsating. This lead to the 2/18 presentation noted at [facility]. She received toradol, which she states helped, but did not resolve her headache and it continued into the next day and this has continued to this day, with fluctuating severity but never resolves. States the headache is limited to the left parietal region, in a discrete fashion, without radiation. Per chart review, \"Daughter reports that patient saw her PCP and was prescribed Imitrex. However, the Imitrex did not help her headache and so she decided not to take it.\".     After receiving toradol in the ED here her headache severity is a 5/10.     Current migraine headache:  - Aura: none  - Worse with lying flat, unsure about coughing, not worse with bearing down  - No recent changes in sleep, diet, or exercise regimen  - Interfering with ADLs  - No nausea, no vomiting, no light/sound sensitivity   - Associated autonomic features: none     Systemic symptoms: denies fever, fatigue, myalgias, weight loss, neck pain  Associated neuro signs: denies changes in personality, LOC, AMS, weakness/numbness/tingling, vision changes.    PAST MEDICAL HISTORY:     Active Ambulatory Problems     Diagnosis Date Noted    No Active Ambulatory Problems     Resolved Ambulatory Problems     Diagnosis Date Noted    No Resolved Ambulatory Problems     Past Medical History:   Diagnosis Date    Hypertension         PAST SURGICAL HISTORY:     Past Surgical History:   Procedure Laterality Date    NO PERTINENT PAST SURGICAL HISTORY         CURRENT MEDICATIONS:     Current Facility-Administered Medications   Medication Dose Route Frequency " Provider Last Rate Last Admin    ketorolac (Toradol) 15 MG/ML injection 15 mg  15 mg Intravenous Once Domitila Wells M.D.        acetaminophen (Tylenol) tablet 1,000 mg  1,000 mg Oral Q8HRS PRN Domitila Wells M.D.   1,000 mg at 03/05/25 0052    heparin injection 5,000 Units  5,000 Units Subcutaneous Q12HRS Domitila Wells M.D.   5,000 Units at 03/05/25 0521    senna-docusate (Pericolace Or Senokot S) 8.6-50 MG per tablet 2 Tablet  2 Tablet Oral Q EVENING Domitila Wells M.D.   2 Tablet at 03/04/25 1718    And    polyethylene glycol/lytes (Miralax) Packet 1 Packet  1 Packet Oral QDAY PRN Domitila Wells M.D.        Pharmacy Consult Request ...Pain Management Review 1 Each  1 Each Other PHARMACY TO DOSE Domitila Wells M.D.        oxyCODONE immediate-release (Roxicodone) tablet 2.5 mg  2.5 mg Oral Q3HRS PRN Domitila Wells M.D.        Or    oxyCODONE immediate-release (Roxicodone) tablet 5 mg  5 mg Oral Q3HRS PRN Domitila Wells M.D.   5 mg at 03/04/25 2212    Or    HYDROmorphone (Dilaudid) injection 0.25 mg  0.25 mg Intravenous Q3HRS PRN Domitila Wells M.D.   0.25 mg at 03/04/25 2313       ALLERGIES:   No Known Allergies     SOCIAL HISTORY:   Does not smoke or drinks  , with one daughter, one son    FAMILY HISTORY:   History reviewed. No pertinent family history.   Diabetes    REVIEW OF SYSTEMS:   For the following ROS, pertinent positives are in bold; pertinent negatives are in italics.  CONSTITUTIONAL: fevers, chills, sweats, weight loss, fatigue  EYES: vision changes, double vision, blurring  ENMT: hearing loss, tinnitus, epistaxis, sores, voice changes, sore throat  CV: chest pain, dyspnea, palpitations, orthopnea, lower extremity edema  RESP: cough, sputum, dyspnea, hemoptysis, pleuritic pain  GI: abdominal pain, nausea, vomiting, hematemesis, diarrhea, constipation, BRBPR, melena  : hematuria, dysuria, frequency, urgency  MSKL: pain, muscle weakness, joint pain, joint swelling, decreased  ROM  SKIN: rash, pain, pruritis, lesions, lumps, skin breakdown  NEURO: See HPI  PSYCH: depression, anxiety, suicidal ideation, homicidal ideation     PHYSICAL EXAM:     Vitals:    03/05/25 0735   BP: 114/72   Pulse: 70   Resp: 18   Temp: 36.7 °C (98 °F)   SpO2: 96%       NEUROLOGIC EXAM    Mental Status:  Level of alertness: Awake  Orientation: Oriented to self, location, time & situation  Attention:  Able to perform serial 7s, able to spell WORLD forwards and backwards  Speech: Normal quality, quantity, rate, volume and prosody  Language: Fluent, no aphasia or paraphasic errors, normal semantic fluency  Short Term Memory: Intact to 5 objects after 5 minutes.   Higher Cognitive Function: No apraxia, no finger agnosia  Fund of Knowledge: Knows current president and , able to name 3 current events    Cranial Nerves:   Pupils equally round 3 mm and reactive to light 2 mm  Extraocular movements intact without nystagmus  Full visual fields bilaterally to confrontation  Facial sensation to light touch intact bilaterally over V1-V3  No facial asymmetry  Hearing intact   No dysarthria  Tongue and palate midline  Shoulder shrug and neck turn intact.    Motor: Normal bulk and tone  No fasciculations. No spasticity. No cogwheel. No pronator drift. Sustained Agx4     Reflexes:    Root Right Left Comment   biceps C5 2 2     brachioradialis C6 2 2     triceps C7 2 2     patella L3 (L4) 2 2     achilles S1 (S2) 2 2     jaw jerk CNV * *     pecs C5-T1 * *     magana C8-T1 neg neg     crossed adductors L2-L4 * *     plantar response S1-S2 Flexor Flexor     *Not tested    Sensory:   SILT x4    Coordination: No dysmetria on finger to nose or heel to shin    Gait: Deferred       DATA:     Labs  Lab Results   Component Value Date    GLUCOSE 111 (H) 03/04/2025    BUN 16 03/04/2025    CO2 25 03/04/2025     Lab Results   Component Value Date/Time    WBC 9.8 03/01/2025 2008    MCV 90.7 03/01/2025 2008      No results found  "for: \"TSH\"  No components found for: \"RNDFMH2GD6\", \"B0CGISF\", \"T4AUTO\", \"V9FIWXR\"  No components found for: \"HGBA1C\"  Lab Results   Component Value Date    HDL 79 08/16/2019    LDL 95 08/16/2019     No results found for: \"KLPZ12WG\"  No results found for: \"FERRITIN\", \"FOLATE\"  Lab Results   Component Value Date    INR 1.16 (H) 03/04/2025     No results found for: \"ZHUKZJPL24\", \"RPR\"  Calcium   Date Value Ref Range Status   03/01/2025 9.3 8.4 - 10.2 mg/dL Final     Albumin   Date Value Ref Range Status   03/01/2025 3.2 3.2 - 4.9 g/dL Final     Hemoglobin   Date Value Ref Range Status   03/01/2025 11.0 (L) 12.0 - 16.0 g/dL Final     @lastb12@    Imaging:   Images were personally reviewed by me.     Total time spent: 60 minutes    "

## 2025-03-05 NOTE — DISCHARGE PLANNING
Patient status updated to observation status per attending MD   Determination Pepito Ocampo and UR committee MD secondary review   Sari Garcia. Patient Status Update completed.

## 2025-03-05 NOTE — CARE PLAN
The patient is Stable - Low risk of patient condition declining or worsening    Shift Goals  Clinical Goals: Patient will remain free from falls and injury throughout shift  Patient Goals: rest and comfort    Progress made toward(s) clinical / shift goals:      Problem: Pain - Standard  Goal: Alleviation of pain or a reduction in pain to the patient’s comfort goal  Outcome: Progressing     Problem: Knowledge Deficit - Standard  Goal: Patient and family/care givers will demonstrate understanding of plan of care, disease process/condition, diagnostic tests and medications  Outcome: Progressing       Patient is not progressing towards the following goals:

## 2025-03-05 NOTE — PROGRESS NOTES
[facility]   DEPARTMENT OF NEUROLOGY    FOLLOW UP ENCOUNTER     MRN: [id number]  Patient seen at the request of: Dr. Dheeraj Mijares M.D.  Chief Complaint/Reason for Consult:  headache    INTERVAL EVENTS:   - headache severity greatly improved as of this morning. Once again reporting significant improvement in pain with toradol.   - esr/crp significantly elevated, even after being age adjusted  - MRI Brain w/wo and MRA studies have been largely underwhelming.   - No new focal neurological deficits.     IMPRESSION & RECOMMENDATIONS:   New onset headache without migrainous features. Suspect this is a primary headache disorder, secondary culprits have largely been rule out with extensive CNS imaging. Though inflammatory markers are significantly elevated, they are non-specific, and my suspicion for GCA is fairly low given the lack of accompanying symptoms: no jaw claudication, temporal tenderness, and PMR symptoms. Headache without clear migrainous features though migraine still in the differential diagnosis. Consider nummular headache as well. Hemicrania continua is also in the differential though lack of autonomic cranial features make this far less likely, though patient's response to NSAIDs raise the question of whether a indomethacin trial may be helpful here.     - indomethacin trial with 25 mg every eight hours; can be further titrated up or down based on clinical course.   - can consider medrol dose zena if no response to indomethacin  - outpatient neurology follow up    Neurology will sign off at this time. Please reach back out to us with any questions or concerns.    Signed:  Eliceo Vasquez DO  Department of Neurology  [facility]    HISTORY OF PRESENT ILLNESS:   Shannan Barry is an 69 y.o. right-handed female who presented to the ED with episodic headaches. History was obtained from patient.     Headaches started three weeks ago, was working as a house  "keeper. Initially severity was about a 3/10, and involved the left parietal region. No associated neurological symptoms, and headache resolved after approximately 30 minutes. Approximately one week later she presented to the ED with a headache, and states it was maximal intensity 10/10 at onset, same location, and the quality was throbbing/pulsating. This lead to the 2/18 presentation noted at [facility]. She received toradol, which she states helped, but did not resolve her headache and it continued into the next day and this has continued to this day, with fluctuating severity but never resolves. States the headache is limited to the left parietal region, in a discrete fashion, without radiation. Per chart review, \"Daughter reports that patient saw her PCP and was prescribed Imitrex. However, the Imitrex did not help her headache and so she decided not to take it.\".     After receiving toradol in the ED here her headache severity is a 5/10.     Current migraine headache:  - Aura: none  - Worse with lying flat, unsure about coughing, not worse with bearing down  - No recent changes in sleep, diet, or exercise regimen  - Interfering with ADLs  - No nausea, no vomiting, no light/sound sensitivity   - Associated autonomic features: none     Systemic symptoms: denies fever, fatigue, myalgias, weight loss, neck pain  Associated neuro signs: denies changes in personality, LOC, AMS, weakness/numbness/tingling, vision changes.    PAST MEDICAL HISTORY:     Active Ambulatory Problems     Diagnosis Date Noted    No Active Ambulatory Problems     Resolved Ambulatory Problems     Diagnosis Date Noted    No Resolved Ambulatory Problems     Past Medical History:   Diagnosis Date    Hypertension         PAST SURGICAL HISTORY:     Past Surgical History:   Procedure Laterality Date    NO PERTINENT PAST SURGICAL HISTORY         CURRENT MEDICATIONS:     Current Facility-Administered Medications   Medication Dose Route Frequency " Provider Last Rate Last Admin    ketorolac (Toradol) 15 MG/ML injection 15 mg  15 mg Intravenous Once Domitila Wells M.D.        acetaminophen (Tylenol) tablet 1,000 mg  1,000 mg Oral Q8HRS PRN Domitila Wells M.D.   1,000 mg at 03/05/25 0052    heparin injection 5,000 Units  5,000 Units Subcutaneous Q12HRS Domitila Wells M.D.   5,000 Units at 03/05/25 0521    senna-docusate (Pericolace Or Senokot S) 8.6-50 MG per tablet 2 Tablet  2 Tablet Oral Q EVENING Domitila Wells M.D.   2 Tablet at 03/04/25 1718    And    polyethylene glycol/lytes (Miralax) Packet 1 Packet  1 Packet Oral QDAY PRN Domitila Wells M.D.        Pharmacy Consult Request ...Pain Management Review 1 Each  1 Each Other PHARMACY TO DOSE Domitila Wells M.D.        oxyCODONE immediate-release (Roxicodone) tablet 2.5 mg  2.5 mg Oral Q3HRS PRN Domitila Wells M.D.        Or    oxyCODONE immediate-release (Roxicodone) tablet 5 mg  5 mg Oral Q3HRS PRN Domitila Wells M.D.   5 mg at 03/04/25 2212    Or    HYDROmorphone (Dilaudid) injection 0.25 mg  0.25 mg Intravenous Q3HRS PRN Domitila Wlels M.D.   0.25 mg at 03/04/25 2313       ALLERGIES:   No Known Allergies     SOCIAL HISTORY:   Does not smoke or drinks  , with one daughter, one son    FAMILY HISTORY:   History reviewed. No pertinent family history.   Diabetes    REVIEW OF SYSTEMS:   For the following ROS, pertinent positives are in bold; pertinent negatives are in italics.  CONSTITUTIONAL: fevers, chills, sweats, weight loss, fatigue  EYES: vision changes, double vision, blurring  ENMT: hearing loss, tinnitus, epistaxis, sores, voice changes, sore throat  CV: chest pain, dyspnea, palpitations, orthopnea, lower extremity edema  RESP: cough, sputum, dyspnea, hemoptysis, pleuritic pain  GI: abdominal pain, nausea, vomiting, hematemesis, diarrhea, constipation, BRBPR, melena  : hematuria, dysuria, frequency, urgency  MSKL: pain, muscle weakness, joint pain, joint swelling, decreased  ROM  SKIN: rash, pain, pruritis, lesions, lumps, skin breakdown  NEURO: See HPI  PSYCH: depression, anxiety, suicidal ideation, homicidal ideation     PHYSICAL EXAM:     Vitals:    03/05/25 0735   BP: 114/72   Pulse: 70   Resp: 18   Temp: 36.7 °C (98 °F)   SpO2: 96%       NEUROLOGIC EXAM    Mental Status:  Level of alertness: Awake  Orientation: Oriented to self, location, time & situation  Attention:  Able to perform serial 7s, able to spell WORLD forwards and backwards  Speech: Normal quality, quantity, rate, volume and prosody  Language: Fluent, no aphasia or paraphasic errors, normal semantic fluency  Short Term Memory: Intact to 5 objects after 5 minutes.   Higher Cognitive Function: No apraxia, no finger agnosia  Fund of Knowledge: Knows current president and , able to name 3 current events    Cranial Nerves:   Pupils equally round 3 mm and reactive to light 2 mm  Extraocular movements intact without nystagmus  Full visual fields bilaterally to confrontation  Facial sensation to light touch intact bilaterally over V1-V3  No facial asymmetry  Hearing intact   No dysarthria  Tongue and palate midline  Shoulder shrug and neck turn intact.    Motor: Normal bulk and tone  No fasciculations. No spasticity. No cogwheel. No pronator drift. Sustained Agx4     Reflexes:    Root Right Left Comment   biceps C5 2 2     brachioradialis C6 2 2     triceps C7 2 2     patella L3 (L4) 2 2     achilles S1 (S2) 2 2     jaw jerk CNV * *     pecs C5-T1 * *     magana C8-T1 neg neg     crossed adductors L2-L4 * *     plantar response S1-S2 Flexor Flexor     *Not tested    Sensory:   SILT x4    Coordination: No dysmetria on finger to nose or heel to shin    Gait: Deferred       DATA:     Labs  Lab Results   Component Value Date    GLUCOSE 111 (H) 03/04/2025    BUN 16 03/04/2025    CO2 25 03/04/2025     Lab Results   Component Value Date/Time    WBC 9.8 03/01/2025 2008    MCV 90.7 03/01/2025 2008      No results found  "for: \"TSH\"  No components found for: \"QDDFTL4SX4\", \"S1MGUTN\", \"T4AUTO\", \"Q5IEJHU\"  No components found for: \"HGBA1C\"  Lab Results   Component Value Date    HDL 79 08/16/2019    LDL 95 08/16/2019     No results found for: \"SBPS49QE\"  No results found for: \"FERRITIN\", \"FOLATE\"  Lab Results   Component Value Date    INR 1.16 (H) 03/04/2025     No results found for: \"JMQHUSYW40\", \"RPR\"  Calcium   Date Value Ref Range Status   03/01/2025 9.3 8.4 - 10.2 mg/dL Final     Albumin   Date Value Ref Range Status   03/01/2025 3.2 3.2 - 4.9 g/dL Final     Hemoglobin   Date Value Ref Range Status   03/01/2025 11.0 (L) 12.0 - 16.0 g/dL Final     @lastb12@    Imaging:   Images were personally reviewed by me.     Total time spent: 35 minutes    "

## 2025-03-06 ENCOUNTER — PHARMACY VISIT (OUTPATIENT)
Dept: PHARMACY | Facility: MEDICAL CENTER | Age: 70
End: 2025-03-06
Payer: COMMERCIAL

## 2025-03-06 VITALS
BODY MASS INDEX: 16.64 KG/M2 | RESPIRATION RATE: 17 BRPM | WEIGHT: 97.44 LBS | SYSTOLIC BLOOD PRESSURE: 131 MMHG | HEIGHT: 64 IN | HEART RATE: 76 BPM | DIASTOLIC BLOOD PRESSURE: 79 MMHG | TEMPERATURE: 97.7 F | OXYGEN SATURATION: 98 %

## 2025-03-06 PROCEDURE — 700102 HCHG RX REV CODE 250 W/ 637 OVERRIDE(OP)

## 2025-03-06 PROCEDURE — 700111 HCHG RX REV CODE 636 W/ 250 OVERRIDE (IP)

## 2025-03-06 PROCEDURE — 96372 THER/PROPH/DIAG INJ SC/IM: CPT

## 2025-03-06 PROCEDURE — A9270 NON-COVERED ITEM OR SERVICE: HCPCS

## 2025-03-06 PROCEDURE — G0378 HOSPITAL OBSERVATION PER HR: HCPCS

## 2025-03-06 PROCEDURE — RXMED WILLOW AMBULATORY MEDICATION CHARGE

## 2025-03-06 RX ORDER — METHYLPREDNISOLONE 4 MG/1
8 TABLET ORAL
Status: DISCONTINUED | OUTPATIENT
Start: 2025-03-07 | End: 2025-03-06

## 2025-03-06 RX ORDER — METHYLPREDNISOLONE 4 MG/1
4 TABLET ORAL
Status: DISCONTINUED | OUTPATIENT
Start: 2025-03-07 | End: 2025-03-06

## 2025-03-06 RX ORDER — OMEPRAZOLE 20 MG/1
20 CAPSULE, DELAYED RELEASE ORAL DAILY
Qty: 30 CAPSULE | Refills: 0 | Status: SHIPPED | OUTPATIENT
Start: 2025-03-06

## 2025-03-06 RX ORDER — METHYLPREDNISOLONE 4 MG/1
4 TABLET ORAL
Status: DISCONTINUED | OUTPATIENT
Start: 2025-03-09 | End: 2025-03-06

## 2025-03-06 RX ORDER — METHYLPREDNISOLONE 4 MG/1
4 TABLET ORAL 2 TIMES DAILY WITH MEALS
Status: CANCELLED | OUTPATIENT
Start: 2025-03-10 | End: 2025-03-11

## 2025-03-06 RX ORDER — METHYLPREDNISOLONE 4 MG/1
8 TABLET ORAL
Status: CANCELLED | OUTPATIENT
Start: 2025-03-07 | End: 2025-03-08

## 2025-03-06 RX ORDER — ONDANSETRON 4 MG/1
4 TABLET, ORALLY DISINTEGRATING ORAL ONCE
Status: COMPLETED | OUTPATIENT
Start: 2025-03-06 | End: 2025-03-06

## 2025-03-06 RX ORDER — METHYLPREDNISOLONE 4 MG/1
4 TABLET ORAL
Status: CANCELLED | OUTPATIENT
Start: 2025-03-09 | End: 2025-03-10

## 2025-03-06 RX ORDER — METHYLPREDNISOLONE 4 MG/1
4 TABLET ORAL
Status: CANCELLED | OUTPATIENT
Start: 2025-03-07 | End: 2025-03-07

## 2025-03-06 RX ORDER — METHYLPREDNISOLONE 4 MG/1
4 TABLET ORAL
Status: DISCONTINUED | OUTPATIENT
Start: 2025-03-08 | End: 2025-03-06

## 2025-03-06 RX ORDER — INDOMETHACIN 50 MG/1
50 CAPSULE ORAL
Qty: 90 CAPSULE | Refills: 0 | Status: SHIPPED | OUTPATIENT
Start: 2025-03-06 | End: 2025-04-05

## 2025-03-06 RX ORDER — METHYLPREDNISOLONE 4 MG/1
4 TABLET ORAL
Status: CANCELLED | OUTPATIENT
Start: 2025-03-08 | End: 2025-03-09

## 2025-03-06 RX ORDER — METHYLPREDNISOLONE 4 MG/1
4 TABLET ORAL 2 TIMES DAILY WITH MEALS
Status: DISCONTINUED | OUTPATIENT
Start: 2025-03-10 | End: 2025-03-06

## 2025-03-06 RX ADMIN — INDOMETHACIN 50 MG: 50 CAPSULE ORAL at 08:28

## 2025-03-06 RX ADMIN — INDOMETHACIN 50 MG: 50 CAPSULE ORAL at 14:10

## 2025-03-06 RX ADMIN — ONDANSETRON 4 MG: 4 TABLET, ORALLY DISINTEGRATING ORAL at 10:50

## 2025-03-06 RX ADMIN — OMEPRAZOLE 20 MG: 20 CAPSULE, DELAYED RELEASE ORAL at 05:38

## 2025-03-06 RX ADMIN — HEPARIN SODIUM 5000 UNITS: 5000 INJECTION, SOLUTION INTRAVENOUS; SUBCUTANEOUS at 05:38

## 2025-03-06 ASSESSMENT — SOCIAL DETERMINANTS OF HEALTH (SDOH)
WITHIN THE PAST 12 MONTHS, THE FOOD YOU BOUGHT JUST DIDN'T LAST AND YOU DIDN'T HAVE MONEY TO GET MORE: NEVER TRUE
WITHIN THE PAST 12 MONTHS, YOU WORRIED THAT YOUR FOOD WOULD RUN OUT BEFORE YOU GOT THE MONEY TO BUY MORE: NEVER TRUE
IN THE PAST 12 MONTHS, HAS THE ELECTRIC, GAS, OIL, OR WATER COMPANY THREATENED TO SHUT OFF SERVICE IN YOUR HOME?: NO

## 2025-03-06 ASSESSMENT — PATIENT HEALTH QUESTIONNAIRE - PHQ9
SUM OF ALL RESPONSES TO PHQ9 QUESTIONS 1 AND 2: 0
2. FEELING DOWN, DEPRESSED, IRRITABLE, OR HOPELESS: NOT AT ALL
1. LITTLE INTEREST OR PLEASURE IN DOING THINGS: NOT AT ALL

## 2025-03-06 NOTE — CARE PLAN
The patient is Stable - Low risk of patient condition declining or worsening    Shift Goals  Clinical Goals: Keep pt;s pain <7 through entire shift  Patient Goals: Manage head pain  Family Goals: Updates    Progress made toward(s) clinical / shift goals: Administered medications including any PRN medications in accordance to the MAR. Observed pt ambulating the halls by self for 10-15 minutes. Hourly rounding was completed. The pt's bed remained in the lowest position with the brake on the entire shift. All needs were met.     Problem: Pain - Standard  Goal: Alleviation of pain or a reduction in pain to the patient’s comfort goal  Outcome: Progressing     Problem: Knowledge Deficit - Standard  Goal: Patient and family/care givers will demonstrate understanding of plan of care, disease process/condition, diagnostic tests and medications  Outcome: Progressing     Problem: Mobility  Goal: Patient's capacity to carry out activities will improve  Outcome: Progressing       Patient is not progressing towards the following goals:

## 2025-03-06 NOTE — DIETARY
"Nutrition Services: Initial Assessment     Day 0 of admit. Shannan Barry is 69 y.o., female with admitting DX of Acute intractable headache, unspecified headache type [R51.9].    Consult Received for: MST - Malnutrition Screening Tool and BMI    Pt admitted with acute intractable headache per EMR/provider note.        Nutrition Assessment:      Height: 162.6 cm (5' 4\")  Weight: 44.2 kg (97 lb 7.1 oz)  Weight taken via: Stand Up Scale  BMI Calculated: 16.73  BMI Classification: Underweight       Weight Readings from Last 5 encounters:   Wt Readings from Last 5 Encounters:   03/04/25 44.2 kg (97 lb 7.1 oz)   03/04/25 43.1 kg (95 lb)   03/01/25 43.9 kg (96 lb 12.5 oz)   02/27/25 44.1 kg (97 lb 3.6 oz)   02/18/25 43.4 kg (95 lb 10.9 oz)         Objective:   Pertinent Labs: Reviewed  Pertinent Meds: heparin, omeprazole, bowel regimen  Skin/Wounds:  no concerns noted  Food Allergies: NKFA  Last BM:     03/03/25 (as stated by pt)       Current Diet Order/Intake:   Regular diet    PO intake 50-75% x 2 meals    Nutrition Focused Physical Exam (NFPE)  NFPE did not occur    Nutrition Diagnosis:      Underweight related to inadequate energy intake as evidence by BMI <18.5    Nutrition Interventions:      Resume regular diet  Patient aware of active plan of care as appropriate.       Nutrition Monitoring and Evaluation:      Monitor nutrition POC, goal for >65% intake from meals and supplements.  Additional fluids per MD/DO  Monitor vital signs pertinent to nutrition.    RD following and will provide updated recommendations as indicated.      Roula aWlsh R.D.                                         ASPEN/AND CRITERIA FOR MALNUTRITION       "

## 2025-03-06 NOTE — PROGRESS NOTES
Discharge orders received.  Patient arrived to the discharge lounge. Used  [id number]. PIV removed.  Instructions given, medications reviewed and general discharge education provided to patient.  Follow up appointments discussed.  Patient verbalized understanding of dc instructions and prescriptions.  Patient signed discharge instructions.  Patient verbalized understanding had all belongings with her.  Patient left with meds and is waiting for daughter to come get her. Wished patient a speedy recovery.

## 2025-03-06 NOTE — CARE PLAN
The patient is Stable - Low risk of patient condition declining or worsening    Shift Goals  Clinical Goals: Decrease headache <4/10 by end of shift  Patient Goals: Decrease headache <4/10 by end of shift  Family Goals: Updates    Progress made toward(s) clinical / shift goals:  Shannan endorsed improvement to the provider with her new medication. She is being discharged home and understands how and when to take the new medication. She will follow up with her neurologist outpatient.     Patient is not progressing towards the following goals:

## 2025-03-06 NOTE — PROGRESS NOTES
Daughter at bedside, RN explained plan of care to pt and daughter from Dr Wells. Pt to try Indocin at 17:30pm tonight, ok to discharge if feeling better but daughter hesitant to leave after one dose. RN notified provider (White).

## 2025-03-06 NOTE — CARE PLAN
The patient is Watcher - Medium risk of patient condition declining or worsening    Shift Goals  Clinical Goals: Neurostatus will remain stable  Patient Goals: Decrease pain <6/10  Family Goals: Updates    Progress made toward(s) clinical / shift goals:  Matt neuro status has remained stable throughout this shift.     Patient is not progressing towards the following goals:      Problem: Pain - Standard  Goal: Alleviation of pain or a reduction in pain to the patient’s comfort goal  Description: Target End Date:  Prior to discharge or change in level of care    Document on Vitals flowsheet    1.  Document pain using the appropriate pain scale per order or unit policy  2.  Educate and implement non-pharmacologic comfort measures (i.e. relaxation, distraction, massage, cold/heat therapy, etc.)  3.  Pain management medications as ordered  4.  Reassess pain after pain med administration per policy  5.  If opiods administered assess patient's response to pain medication is appropriate per POSS sedation scale  6.  Follow pain management plan developed in collaboration with patient and interdisciplinary team (including palliative care or pain specialists if applicable)  Outcome: Not Progressing

## 2025-03-06 NOTE — PROGRESS NOTES
[facility] FAMILY MEDICINE PROGRESS NOTE     Attending: Pepito Ocampo M.d. M.D.  Senior Resident: Domitila Wells M.D. (PGY-2)  Pedro Resident: Farideh Garrido D.O. (PGY-1)  PATIENT: Shannan Barry; [id number]; 1955    Hospital Day # Hospital Day: 2    ID: 69 y.o. female with no significant past medical history was admitted on 3/4/2025 for intractable headache, with MRI and MRA imaging negative but elevated ESR and CRP, here for pain control.    24 Hour Events: No acute events overnight     Subjective: This morning patient stated that she continued to have intractable headache at same severity, unchanged. Denied any other new weakness, numbness, vision changes, hearing changes. Received IV toradol in the morning which did help improve her headache symptoms.     OBJECTIVE:  Temp:  [36.6 °C (97.9 °F)-36.9 °C (98.4 °F)] 36.7 °C (98 °F)  Pulse:  [69-70] 70  Resp:  [16-18] 18  BP: (114-131)/(72-78) 114/72  SpO2:  [95 %-98 %] 96 %    Intake/Output Summary (Last 24 hours) at 3/5/2025 1631  Last data filed at 3/5/2025 1600  Gross per 24 hour   Intake 120 ml   Output --   Net 120 ml       PE:  Gen: No acute distress, resting comfortably in bed  HEENT: normocephalic, atraumatic, EOMI  Pulm: clear to auscultation bilaterally, no respiratory distress   Cardio: RRR, no M/R/G  Abdom: soft, nontender, nondistended, normoactive bowel sounds in all quadrants  Ext: No edema, 2+ pulses bilaterally  Neuro: Grossly intact    LABS:  Recent Labs     03/04/25  1154 03/05/25  0326   WBC 8.9 9.2   RBC 3.61* 3.66*   HEMOGLOBIN 10.5* 10.4*   HEMATOCRIT 31.9* 33.1*   MCV 88.4 90.4   MCH 29.1 28.4   RDW 38.4 40.1   PLATELETCT 415 415   MPV 8.3* 8.5*   NEUTSPOLYS 86.00*  --    LYMPHOCYTES 6.80*  --    MONOCYTES 5.80  --    EOSINOPHILS 0.40  --    BASOPHILS 0.30  --      Recent Labs     03/04/25  1154   SODIUM 136   POTASSIUM 4.0   CHLORIDE 99   CO2 25   BUN 16   CREATININE 0.58   CALCIUM 9.4   ALBUMIN 3.3     Estimated GFR/CRCL = CrCl  "cannot be calculated (Unknown ideal weight.).  Recent Labs     03/04/25  1154   GLUCOSE 111*     Recent Labs     03/04/25  1154   ASTSGOT 18   ALTSGPT 10   TBILIRUBIN 0.4   ALKPHOSPHAT 101*   GLOBULIN 4.4*   INR 1.16*             Recent Labs     03/04/25  1154   INR 1.16*   APTT 30.7       MICROBIOLOGY:   No results found for: \"BLOODCULTU\", \"BLDCULT\", \"BCHOLD\"     IMAGING:   MR-MRA HEAD-W/O   Final Result      Unremarkable MR angiogram of the visualized proximal cerebral vasculature.      MR-BRAIN-WITH & W/O   Final Result      Unremarkable pre and postcontrast MR examination of the brain except for a few punctate nonspecific white matter T2 hyperintensities likely representing nonspecific foci of gliosis of no clinical significance.          MEDS:  Current Facility-Administered Medications   Medication Last Admin    indomethacin (Indocin) capsule 50 mg      omeprazole (PriLOSEC) capsule 20 mg 20 mg at 03/05/25 1609    acetaminophen (Tylenol) tablet 1,000 mg 1,000 mg at 03/05/25 1613    heparin injection 5,000 Units 5,000 Units at 03/05/25 0521    senna-docusate (Pericolace Or Senokot S) 8.6-50 MG per tablet 2 Tablet 2 Tablet at 03/04/25 1718    And    polyethylene glycol/lytes (Miralax) Packet 1 Packet      Pharmacy Consult Request ...Pain Management Review 1 Each      oxyCODONE immediate-release (Roxicodone) tablet 2.5 mg      Or    oxyCODONE immediate-release (Roxicodone) tablet 5 mg 5 mg at 03/04/25 2212    Or    HYDROmorphone (Dilaudid) injection 0.25 mg 0.25 mg at 03/04/25 2313       PROBLEM LIST:  Problem Noted   Acute Intractable Headache, Unspecified Headache Type 3/4/2025       ASSESSMENT/PLAN: 69 y.o. female with no significant past medical history was admitted on 3/4/2025 for intractable headache, with MRI and MRA imaging negative but elevated ESR and CRP, here for pain control.    * Acute intractable headache, unspecified headache type- (present on admission)  Assessment & Plan  Patient with " intractable, severe, positional headache for the past 3 weeks.  Presented here from her neurologist's office as a direct transfer to the ED, has had multiple ER visits for the same issue.  Associated with worsened headache with lying down flat as well as with looking down.  Features are atypical for migraine.  Given length of time, severity, and atypical characteristics of headache will initiate workup for possible secondary headache in setting of mass lesion vs malignancy vs vasculitis.  Also possible atypical migraine.  - Neurology consulted in ED, appreciate recommendations:   - Completed migraine cocktail including 1 L NS, Tylenol 1 g every 8 hours as needed, magnesium 4 g IV  - MRI brain with and without contrast ordered and showed no acute abnormalities  - MRA brain without contrast ordered and showed no acute abnormalities  - ESR and CRP ordered, CRP elevated at 7.62, ESR elevated at 65  -Per neurology recommendations, will begin indomethacin 3 times daily, will also add on omeprazole 20 mg daily for GI protection.  If patient does not have response to indomethacin, will plan to start Medrol Dosepak.  - As needed low-dose oxycodone 2.5 and 5 mg with breakthrough Dilaudid ordered  - Will add on prn antiemetics if needed      Core Measures:  Fluids: PO  Lines: PIV  Abx: None  Diet: Regular  PPX: SCDs/TEDs, Heparin      #DISPOSITION  - Inpatient due to continued intractable headache, pain management. Will plan to DC either this evening or in AM if pain is well-controlled.     Domitila Wells M.D.  PGY-2  [facility]

## 2025-03-06 NOTE — PROGRESS NOTES
RN utilized interpretive services to discuss plan of care. Pt states she felt a lot of relief last night but this morning her headache is back, no relief with 0830 dose of Indocin, rating headache 6/10. She is now complaining of nausea despite taking medication with food.     RN updated provider (Russell). Provider acknowledged, will place PRN zofran.

## 2025-03-13 NOTE — DISCHARGE SUMMARY
"[facility] Discharge Summary    Attending: Dr. Pepito Ocampo  Senior Resident: Dr. Domitila Wells  Intern:  Dr. Farideh Garrido  Contact Number: [phone]    CHIEF COMPLAINT ON ADMISSION  Chief Complaint   Patient presents with    Headache     X3 weeks, left sided throbbing pain. Patient reports she has been unable to sleep due to pain.     Transfer note stating possible SI. Denies SI to this RN, but \"wants the pain to go away, but would not hurt self\".        Reason for Admission  Headache; Sent by MD     Admission Date  3/4/2025    CODE STATUS  Prior    HPI & HOSPITAL COURSE  This is a 69 y.o. female here with no significant past medical history who presented with intractable positional headache as a transfer from her neurologist's office for further in-hospital evaluation.    HPI: Shannan Barry is a 69 y.o. female with no significant past medical history who presented with intractable positional headache.  She was seen in her outpatient neurologist office on day of admission and sent to the ED for further workup and admission.  Patient reports sudden onset of severe, throbbing, sharp, left-sided headache on 2/10/2025.  States that this same pain has been constant since that time.  Worsens with positional changes, especially lying down flat.  Also reports worsening headache with looking down and leaning forwards.  Has tried Imitrex at home which did not have any impact on the headache.  Also has been taking tramadol as needed at home similarly without change in headache.  The only medication so far that has helped.  Pain is IV fentanyl which she has received in the ED.  Has had multiple ED visits since 2/18 for this pain.  Denies any confusion, weakness, numbness/tingling, joint pains, dysuria, hematuria, nausea vomiting, chest pain, shortness of breath.  Also denies phonophobia, photophobia.  Has no prior history of migraine headaches.  Denies any family history of migraine headaches.    "   ERCourse:  On arrival to the ED, patient's vital signs were overall stable other than blood pressure elevated at 166/78.  Lab work including CBC, CMP, INR ordered and showed normocytic anemia with hemoglobin 10.5, stable from prior visits.  CT head from prior ED visit showed no acute intracranial abnormality.  Neurologist on-call consulted from ED.    Hospital Course:  Patient treated with recommended medications per neurology, including 1L NS, tylenol 1g q8h as needed, magnesium 4g IV. MRI brain with and without contrast showed few punctate nonspecific white matter T2 hyperintensities likely representing nonspecific foci of gliosis, otherwise unremarkable. MRA head unremarkable. ESR elevated at 65, CRP also elevated at 7.62. Due to concern for possible atypical migraine vs nummular headache vs hemicrania continua, and patient's response to NSAIDs including Toradol while inpatient, trial of indomethacin was started per neurologist recommendations. Patient received two doses of Indomethacin 25mg q8h with significant improvement in headache symptoms. Was medically stable for discharge on 3/06/25.      Therefore, she is discharged in good and stable condition to home with close outpatient follow-up.    The patient met 2-midnight criteria for an inpatient stay at the time of discharge.    Discharge Date  3/6/2025    Physical Exam on Day of Discharge  Physical Exam  Constitutional:       General: She is not in acute distress.     Appearance: Normal appearance. She is not ill-appearing or toxic-appearing.   HENT:      Head: Normocephalic and atraumatic.      Mouth/Throat:      Mouth: Mucous membranes are moist.      Pharynx: Oropharynx is clear. No oropharyngeal exudate or posterior oropharyngeal erythema.   Eyes:      Extraocular Movements: Extraocular movements intact.      Conjunctiva/sclera: Conjunctivae normal.      Pupils: Pupils are equal, round, and reactive to light.   Cardiovascular:      Rate and Rhythm:  Normal rate and regular rhythm.      Pulses: Normal pulses.      Heart sounds: Normal heart sounds.   Pulmonary:      Effort: Pulmonary effort is normal.      Breath sounds: Normal breath sounds.   Abdominal:      General: Abdomen is flat. Bowel sounds are normal.      Palpations: Abdomen is soft.   Musculoskeletal:      Cervical back: Normal range of motion and neck supple. No rigidity or tenderness.   Neurological:      General: No focal deficit present.      Mental Status: She is alert and oriented to person, place, and time. Mental status is at baseline.      Cranial Nerves: No cranial nerve deficit.      Sensory: No sensory deficit.      Motor: No weakness.       FOLLOW UP ITEMS POST DISCHARGE  - Follow up scheduled with patient's neurologist, Dr. Yanes  - Continue Indomethacin while outpatient given improvement in headache symptoms  - Close follow-up with PCP recommended for within 1 week of discharge    DISCHARGE DIAGNOSES  Principal Problem:    Acute intractable headache, unspecified headache type (POA: Yes)  Resolved Problems:    * No resolved hospital problems. *      FOLLOW UP  Future Appointments   Date Time Provider Department Center   5/13/2025 10:20 AM Kevin Yanes M.D. [facility] None     Monster Maldonado M.D.  [address]  [address]  [facility] [address]  [phone]    Follow up in 1 week(s)      [address]  [facility] [id number] [phone]  Follow up in 1 week(s)        MEDICATIONS ON DISCHARGE     Medication List        START taking these medications        Instructions   indomethacin 50 MG Caps  Commonly known as: Indocin   Take 1 Capsule by mouth 3 times a day with meals for 30 days.  Dose: 50 mg     omeprazole 20 MG delayed-release capsule  Commonly known as: PriLOSEC   Take 1 Capsule by mouth every day.  Dose: 20 mg            CONTINUE taking these medications        Instructions   acetaminophen 325 MG Tabs  Commonly known as: Tylenol   Take 650 mg by  mouth every 8 hours as needed for Moderate Pain. Taking with the tramadol  Dose: 650 mg     FeroSul 325 (65 Fe) MG tablet  Generic drug: ferrous sulfate   Take 325 mg by mouth every 3 days.  Dose: 325 mg     traMADol 50 MG Tabs  Commonly known as: Ultram   Take 50 mg by mouth every 8 hours as needed for Moderate Pain.  Dose: 50 mg            STOP taking these medications      naproxen 500 MG Tabs  Commonly known as: Naprosyn     SUMAtriptan 50 MG Tabs  Commonly known as: Imitrex            Allergies  No Known Allergies    DIET  No orders of the defined types were placed in this encounter.      ACTIVITY  As tolerated.  Weight bearing as tolerated    CONSULTATIONS  Neurology    PROCEDURES  N/A    LABORATORY  Lab Results   Component Value Date    SODIUM 136 03/04/2025    POTASSIUM 4.0 03/04/2025    CHLORIDE 99 03/04/2025    CO2 25 03/04/2025    GLUCOSE 111 (H) 03/04/2025    BUN 16 03/04/2025    CREATININE 0.58 03/04/2025        Lab Results   Component Value Date    WBC 9.2 03/05/2025    HEMOGLOBIN 10.4 (L) 03/05/2025    HEMATOCRIT 33.1 (L) 03/05/2025    PLATELETCT 415 03/05/2025        Total time of the discharge process exceeds 30 minutes.    Domitila Wells M.D.  PGY-2  [facility]

## 2025-04-24 ENCOUNTER — OFFICE VISIT (OUTPATIENT)
Dept: URGENT CARE | Facility: CLINIC | Age: 70
End: 2025-04-24
Payer: COMMERCIAL

## 2025-04-24 VITALS
HEIGHT: 62 IN | DIASTOLIC BLOOD PRESSURE: 78 MMHG | OXYGEN SATURATION: 97 % | RESPIRATION RATE: 18 BRPM | TEMPERATURE: 98 F | HEART RATE: 83 BPM | BODY MASS INDEX: 18.03 KG/M2 | SYSTOLIC BLOOD PRESSURE: 124 MMHG | WEIGHT: 98 LBS

## 2025-04-24 VITALS
OXYGEN SATURATION: 95 % | TEMPERATURE: 97.7 F | BODY MASS INDEX: 18.03 KG/M2 | HEART RATE: 85 BPM | SYSTOLIC BLOOD PRESSURE: 134 MMHG | DIASTOLIC BLOOD PRESSURE: 76 MMHG | WEIGHT: 98 LBS | RESPIRATION RATE: 10 BRPM | HEIGHT: 62 IN

## 2025-04-24 DIAGNOSIS — M54.41 ACUTE RIGHT-SIDED LOW BACK PAIN WITH RIGHT-SIDED SCIATICA: ICD-10-CM

## 2025-04-24 DIAGNOSIS — M25.551 RIGHT HIP PAIN: ICD-10-CM

## 2025-04-24 PROCEDURE — 99214 OFFICE O/P EST MOD 30 MIN: CPT

## 2025-04-24 PROCEDURE — 3078F DIAST BP <80 MM HG: CPT

## 2025-04-24 PROCEDURE — 99213 OFFICE O/P EST LOW 20 MIN: CPT

## 2025-04-24 PROCEDURE — 3075F SYST BP GE 130 - 139MM HG: CPT

## 2025-04-24 PROCEDURE — 3074F SYST BP LT 130 MM HG: CPT

## 2025-04-24 RX ORDER — KETOROLAC TROMETHAMINE 15 MG/ML
15 INJECTION, SOLUTION INTRAMUSCULAR; INTRAVENOUS ONCE
Status: COMPLETED | OUTPATIENT
Start: 2025-04-24 | End: 2025-04-24

## 2025-04-24 RX ORDER — PREDNISONE 20 MG/1
40 TABLET ORAL DAILY
Qty: 10 TABLET | Refills: 0 | Status: ON HOLD | OUTPATIENT
Start: 2025-04-24 | End: 2025-04-29

## 2025-04-24 RX ADMIN — KETOROLAC TROMETHAMINE 15 MG: 15 INJECTION, SOLUTION INTRAMUSCULAR; INTRAVENOUS at 20:09

## 2025-04-24 ASSESSMENT — FIBROSIS 4 INDEX
FIB4 SCORE: 0.93
FIB4 SCORE: 0.93

## 2025-04-24 ASSESSMENT — ENCOUNTER SYMPTOMS
FALLS: 0
TINGLING: 0
FOCAL WEAKNESS: 0

## 2025-04-24 NOTE — PROGRESS NOTES
Subjective:     CHIEF COMPLAINT  Chief Complaint   Patient presents with    Pain     X1WEEK RIGHT HIP/BUTTOCKS discomfort pain radiates down to the knee/and patient would like to get an order to get an MRI         HPI  Shannan Barry is a very pleasant 69 y.o. female who presents accompanied by her daughter with right hip pain that travels from the right low back down the posterior surface of her right leg towards the right knee.  She denies any injury or event preceding the onset of her symptoms.  She has experienced similar symptoms in the past that occasionally also occurs in the left leg/hip.  She is requesting an order for an MRI.  Her symptoms have been present for the past week with progressive worsening.  She reports pain with bearing weight on the right leg as well as walking.  She denies any saddle anesthesia.  She reports that Toradol typically helps to improve her symptoms.  She has tried taking Tylenol at home with some improvement in symptoms.      REVIEW OF SYSTEMS  Review of Systems   Musculoskeletal:  Positive for joint pain (R hip). Negative for falls.   Neurological:  Negative for tingling and focal weakness.       PAST MEDICAL HISTORY  Patient Active Problem List    Diagnosis Date Noted    Acute intractable headache, unspecified headache type 03/04/2025       SURGICAL HISTORY   has a past surgical history that includes no pertinent past surgical history.    ALLERGIES  Allergies   Allergen Reactions    Naproxen Unspecified     BLOOD PRESSURE ISSUES       CURRENT MEDICATIONS  Home Medications       Reviewed by MAGGIE PriceC. (Physician Assistant) on 04/24/25 at 1053  Med List Status: <None>     Medication Last Dose Status   acetaminophen (TYLENOL) 325 MG Tab Taking Active   FEROSUL 325 (65 Fe) MG tablet Not Taking Active   omeprazole (PRILOSEC) 20 MG delayed-release capsule Not Taking Active   traMADol (ULTRAM) 50 MG Tab Not Taking Active                    SOCIAL  "HISTORY  Social History     Tobacco Use    Smoking status: Never    Smokeless tobacco: Never   Vaping Use    Vaping status: Never Used   Substance and Sexual Activity    Alcohol use: No    Drug use: No    Sexual activity: Not on file       FAMILY HISTORY  History reviewed. No pertinent family history.       Objective:     VITAL SIGNS: /78   Pulse 83   Temp 36.7 °C (98 °F) (Temporal)   Resp 18   Ht 1.575 m (5' 2\")   Wt 44.5 kg (98 lb)   SpO2 97%   BMI 17.92 kg/m²     PHYSICAL EXAM  Physical Exam  Vitals reviewed. Exam conducted with a chaperone present.   Constitutional:       General: She is not in acute distress.     Appearance: Normal appearance. She is not ill-appearing or toxic-appearing.   HENT:      Head: Normocephalic and atraumatic.      Mouth/Throat:      Mouth: Mucous membranes are moist.   Eyes:      Conjunctiva/sclera: Conjunctivae normal.      Pupils: Pupils are equal, round, and reactive to light.   Cardiovascular:      Rate and Rhythm: Normal rate.   Pulmonary:      Effort: Pulmonary effort is normal. No respiratory distress.   Musculoskeletal:      Lumbar back: Tenderness present. Positive right straight leg raise test. Negative left straight leg raise test.        Back:       Right hip: No tenderness. Normal range of motion.        Legs:       Comments: Tenderness to palpation on R lumbosacral region. No bruising or swelling. No midline tenderness. Pain traveling down lateral posterior leg to region of knee.    Skin:     General: Skin is warm and dry.      Findings: No bruising.   Neurological:      General: No focal deficit present.      Mental Status: She is alert and oriented to person, place, and time.   Psychiatric:         Mood and Affect: Mood normal.         Assessment/Plan:     1. Right hip pain  - Referral to Orthopedics    2. Acute right-sided low back pain with right-sided sciatica  - Referral to Orthopedics  - predniSONE (DELTASONE) 20 MG Tab; Take 2 Tablets by mouth every " day for 5 days.  Dispense: 10 Tablet; Refill: 0  -Tylenol OTC  -Ice/heat on low back   -Follow up with ortho to discuss request for MRI  - Return to clinic as needed    MDM/Comments:  Patient has stable vital signs and is non-toxic appearing.  Patient is experiencing atraumatic right low back/hip pain that radiates down the posterior surface of her leg towards the knee.  Patient had a positive right-sided straight leg test on physical exam.  Discussed that symptoms may be secondary to sciatica.  Shared decision making used and patient will trial prednisone 40 mg for the next 5 days.  Risks versus benefits of medication discussed with patient and daughter.  Patient will monitor her blood pressure at home while taking this medication and will discontinue if blood pressure elevates.  A referral has been placed to Ortho given recurrence of symptoms for further investigation.  Patient has requested an MRI to be ordered.  Discussed supportive care with ice/heat rest, Tylenol as needed. Patient demonstrated understanding of treatment plan at this time and will RTC if symptoms worsen or fail to resolve.  Patient's daughter has acted as [place] speaking  for visit today.    Differential diagnosis, natural history, supportive care, and indications for immediate follow-up discussed. All questions answered. Patient agrees with the plan of care.    Follow-up as needed if symptoms worsen or fail to improve to PCP, Urgent care or Emergency Room.    I have personally reviewed prior external notes and test results pertinent to today's visit.  I have independently reviewed and interpreted all diagnostics ordered during this urgent care acute visit.   Discussed management options (risks,benefits, and alternatives to treatment). Pt expresses understanding and the treatment plan was agreed upon. Questions were encouraged and answered to pt's satisfaction.    Please note that this dictation was created using voice recognition  software. I have made a reasonable attempt to correct obvious errors, but I expect that there are errors of grammar and possibly content that I did not discover before finalizing the note.

## 2025-04-25 ASSESSMENT — ENCOUNTER SYMPTOMS
SHORTNESS OF BREATH: 0
WEAKNESS: 0
FLANK PAIN: 0
DIZZINESS: 0
NAUSEA: 1
STRIDOR: 0
CHILLS: 0
SEIZURES: 0
ABDOMINAL PAIN: 0
SENSORY CHANGE: 0
FALLS: 0
FEVER: 0
HEADACHES: 0
BACK PAIN: 1
NECK PAIN: 0
SPEECH CHANGE: 0
LOSS OF CONSCIOUSNESS: 0
FOCAL WEAKNESS: 0
MYALGIAS: 0
TINGLING: 0

## 2025-04-25 ASSESSMENT — VISUAL ACUITY: OU: 1

## 2025-04-25 NOTE — PROGRESS NOTES
Subjective     Shannan Barry is a 69 y.o. female who presents with Hip Pain    HPI:   Shannan is a 68yo female presenting for right hip pain that radiates from the right lower back down the posterior surface of her right upper leg. She was seen earlier today for this complaint and prescribed Prednisone. Patient reports intolerability of Prednisone related to nausea and vomiting. Denies recent injury. Reports she intermittently experiences this pain and Toradol has relieved symptoms in the past. Symptoms have been present for one week and are worsening. Denies saddle anesthesia, numbness/tingling in lower extremities, or incontinence of bladder or bowel.     Review of Systems   Constitutional:  Negative for chills, fever and malaise/fatigue.   Respiratory:  Negative for shortness of breath and stridor.    Cardiovascular:  Negative for leg swelling.   Gastrointestinal:  Positive for nausea. Negative for abdominal pain.   Genitourinary:  Negative for dysuria, flank pain, frequency, hematuria and urgency.   Musculoskeletal:  Positive for back pain and joint pain. Negative for falls, myalgias and neck pain.   Skin:  Negative for rash.   Neurological:  Negative for dizziness, tingling, sensory change, speech change, focal weakness, seizures, loss of consciousness, weakness and headaches.       Past Medical History:   Diagnosis Date    Hypertension      Past Surgical History:   Procedure Laterality Date    NO PERTINENT PAST SURGICAL HISTORY       Allergies: Naproxen     Current Outpatient Medications:     omeprazole (PRILOSEC) 20 MG delayed-release capsule, Take 1 Capsule by mouth every day., Disp: 30 Capsule, Rfl: 0    FEROSUL 325 (65 Fe) MG tablet, Take 325 mg by mouth every 3 days., Disp: , Rfl:     traMADol (ULTRAM) 50 MG Tab, Take 50 mg by mouth every 8 hours as needed for Moderate Pain., Disp: , Rfl:     acetaminophen (TYLENOL) 325 MG Tab, Take 650 mg by mouth every 8 hours as needed for Moderate Pain.  "Taking with the tramadol, Disp: , Rfl:      Social History     Tobacco Use    Smoking status: Never    Smokeless tobacco: Never   Vaping Use    Vaping status: Never Used   Substance Use Topics    Alcohol use: No    Drug use: No     History reviewed. No pertinent family history.     Medications, Allergies, and current problem list reviewed today in Epic.      Objective     /76   Pulse 85   Temp 36.5 °C (97.7 °F) (Temporal)   Resp (!) 10   Ht 1.575 m (5' 2\")   Wt 44.5 kg (98 lb)   SpO2 95%   BMI 17.92 kg/m²      Physical Exam  Vitals reviewed.   Constitutional:       General: She is not in acute distress.  HENT:      Nose: Nose normal.   Eyes:      General: Vision grossly intact. Gaze aligned appropriately. No visual field deficit.     Extraocular Movements: Extraocular movements intact.      Right eye: No nystagmus.      Left eye: No nystagmus.      Conjunctiva/sclera: Conjunctivae normal.      Pupils: Pupils are equal, round, and reactive to light.      Right eye: Pupil is round, reactive and not sluggish.      Left eye: Pupil is round, reactive and not sluggish.      Funduscopic exam:     Right eye: Red reflex present.         Left eye: Red reflex present.  Cardiovascular:      Rate and Rhythm: Normal rate and regular rhythm.      Pulses: Normal pulses.           Popliteal pulses are 2+ on the right side and 2+ on the left side.        Dorsalis pedis pulses are 2+ on the right side and 2+ on the left side.        Posterior tibial pulses are 2+ on the right side and 2+ on the left side.      Heart sounds: Normal heart sounds.   Pulmonary:      Effort: Pulmonary effort is normal. No tachypnea, accessory muscle usage, prolonged expiration, respiratory distress or retractions.      Breath sounds: Normal breath sounds. No stridor, decreased air movement or transmitted upper airway sounds. No wheezing, rhonchi or rales.   Musculoskeletal:         General: Tenderness present. No swelling or deformity.      " Cervical back: Full passive range of motion without pain, normal range of motion and neck supple. No swelling, deformity, erythema, rigidity, spasms, tenderness, bony tenderness or crepitus. No pain with movement, spinous process tenderness or muscular tenderness. Normal range of motion.      Thoracic back: No swelling, deformity, spasms, tenderness or bony tenderness. Normal range of motion.      Lumbar back: Spasms and tenderness present. No swelling, deformity or bony tenderness. Decreased range of motion.      Right hip: No deformity, tenderness, bony tenderness or crepitus. Normal range of motion. Normal strength.      Left hip: No deformity, tenderness, bony tenderness or crepitus. Normal range of motion. Normal strength.      Right lower leg: No edema.      Left lower leg: No edema.      Comments: Tenderness to palpation on R lumbosacral region. No bruising or swelling. No midline tenderness. Pain traveling down lateral posterior leg to region of knee.      Skin:     General: Skin is warm and dry.      Capillary Refill: Capillary refill takes less than 2 seconds.      Findings: No bruising, erythema or rash.   Neurological:      Mental Status: She is alert. Mental status is at baseline.      Sensory: Sensation is intact.      Motor: Motor function is intact.      Coordination: Coordination is intact.      Gait: Gait is intact.   Psychiatric:         Mood and Affect: Mood normal.         Behavior: Behavior normal.         Thought Content: Thought content normal.       Assessment & Plan    1. Acute right-sided low back pain with right-sided sciatica   - ketorolac (Toradol) 15 MG/ML injection 15 mg    2. Right hip pain   - ketorolac (Toradol) 15 MG/ML injection 15 mg       MDM/Comments:   Unfortunately, patient unable to tolerate Prednisone. Patient reports good relief in prior exacerbations with Toradol injection. Patient advised to not combine this medication with additional NSAIDs.    Treatment of as above  and initial rest with no heavy lifting, stooping, or strenuous activity. Massage, ice and/or heat which ever feels better, and Tylenol per manufacture's instructions. Encouraged walking, stretching, and range of motion exercises as tolerated. Avoid sitting or laying down for long periods of time except for at night during sleep.   Referral to Orthopedics pending for further evaluation.   Patient is overall very well-appearing, no acute distress, and normal vital signs. Suspicions for acute emergent pathology are low.   Follow up with your PCP or return to urgent care if symptoms not improving in 1-2 weeks.     Illness progression and alarm symptoms discussed with patient, emphasizing low threshold for returning to clinic/emergency department for worsening symptoms. Patient is agreeable to the plan and verbalizes understanding, and will follow up if warranted.       Differential diagnosis, natural history, supportive care, and indications for immediate follow-up discussed.      Follow-up as needed if symptoms worsen or fail to improve to PCP, Urgent care or Emergency Room.                               Electronically signed by YONY Saldivar

## 2025-04-26 ENCOUNTER — APPOINTMENT (OUTPATIENT)
Dept: RADIOLOGY | Facility: MEDICAL CENTER | Age: 70
End: 2025-04-26
Attending: STUDENT IN AN ORGANIZED HEALTH CARE EDUCATION/TRAINING PROGRAM
Payer: COMMERCIAL

## 2025-04-26 ENCOUNTER — HOSPITAL ENCOUNTER (OUTPATIENT)
Facility: MEDICAL CENTER | Age: 70
End: 2025-04-29
Attending: STUDENT IN AN ORGANIZED HEALTH CARE EDUCATION/TRAINING PROGRAM | Admitting: STUDENT IN AN ORGANIZED HEALTH CARE EDUCATION/TRAINING PROGRAM
Payer: COMMERCIAL

## 2025-04-26 DIAGNOSIS — S32.401A CLOSED NONDISPLACED FRACTURE OF RIGHT ACETABULUM, UNSPECIFIED PORTION OF ACETABULUM, INITIAL ENCOUNTER (HCC): Primary | ICD-10-CM

## 2025-04-26 DIAGNOSIS — M87.9 OSTEONECROSIS OF RIGHT HIP (HCC): ICD-10-CM

## 2025-04-26 PROBLEM — S72.001A CLOSED RIGHT HIP FRACTURE, INITIAL ENCOUNTER (HCC): Status: ACTIVE | Noted: 2025-04-26

## 2025-04-26 PROCEDURE — 700101 HCHG RX REV CODE 250: Performed by: STUDENT IN AN ORGANIZED HEALTH CARE EDUCATION/TRAINING PROGRAM

## 2025-04-26 PROCEDURE — 73502 X-RAY EXAM HIP UNI 2-3 VIEWS: CPT | Mod: RT

## 2025-04-26 PROCEDURE — 99222 1ST HOSP IP/OBS MODERATE 55: CPT | Performed by: STUDENT IN AN ORGANIZED HEALTH CARE EDUCATION/TRAINING PROGRAM

## 2025-04-26 PROCEDURE — 770001 HCHG ROOM/CARE - MED/SURG/GYN PRIV*

## 2025-04-26 PROCEDURE — 94760 N-INVAS EAR/PLS OXIMETRY 1: CPT

## 2025-04-26 PROCEDURE — 73700 CT LOWER EXTREMITY W/O DYE: CPT | Mod: RT

## 2025-04-26 PROCEDURE — A9270 NON-COVERED ITEM OR SERVICE: HCPCS | Performed by: STUDENT IN AN ORGANIZED HEALTH CARE EDUCATION/TRAINING PROGRAM

## 2025-04-26 PROCEDURE — 96374 THER/PROPH/DIAG INJ IV PUSH: CPT

## 2025-04-26 PROCEDURE — 99285 EMERGENCY DEPT VISIT HI MDM: CPT

## 2025-04-26 PROCEDURE — 36415 COLL VENOUS BLD VENIPUNCTURE: CPT

## 2025-04-26 PROCEDURE — 700111 HCHG RX REV CODE 636 W/ 250 OVERRIDE (IP): Mod: JZ | Performed by: STUDENT IN AN ORGANIZED HEALTH CARE EDUCATION/TRAINING PROGRAM

## 2025-04-26 PROCEDURE — 72131 CT LUMBAR SPINE W/O DYE: CPT

## 2025-04-26 PROCEDURE — 700102 HCHG RX REV CODE 250 W/ 637 OVERRIDE(OP): Performed by: STUDENT IN AN ORGANIZED HEALTH CARE EDUCATION/TRAINING PROGRAM

## 2025-04-26 RX ORDER — GABAPENTIN 100 MG/1
100 CAPSULE ORAL ONCE
Status: COMPLETED | OUTPATIENT
Start: 2025-04-26 | End: 2025-04-26

## 2025-04-26 RX ORDER — TRAMADOL HYDROCHLORIDE 50 MG/1
50 TABLET ORAL EVERY 4 HOURS PRN
Status: DISCONTINUED | OUTPATIENT
Start: 2025-04-26 | End: 2025-04-27

## 2025-04-26 RX ORDER — LIDOCAINE 4 G/G
1 PATCH TOPICAL ONCE
Status: COMPLETED | OUTPATIENT
Start: 2025-04-26 | End: 2025-04-27

## 2025-04-26 RX ORDER — KETOROLAC TROMETHAMINE 15 MG/ML
15 INJECTION, SOLUTION INTRAMUSCULAR; INTRAVENOUS ONCE
Status: COMPLETED | OUTPATIENT
Start: 2025-04-26 | End: 2025-04-26

## 2025-04-26 RX ORDER — LABETALOL HYDROCHLORIDE 5 MG/ML
10 INJECTION, SOLUTION INTRAVENOUS EVERY 4 HOURS PRN
Status: DISCONTINUED | OUTPATIENT
Start: 2025-04-26 | End: 2025-04-29 | Stop reason: HOSPADM

## 2025-04-26 RX ORDER — ACETAMINOPHEN 325 MG/1
650 TABLET ORAL EVERY 6 HOURS PRN
Status: DISCONTINUED | OUTPATIENT
Start: 2025-04-26 | End: 2025-04-28

## 2025-04-26 RX ORDER — ENOXAPARIN SODIUM 100 MG/ML
40 INJECTION SUBCUTANEOUS DAILY
Status: DISCONTINUED | OUTPATIENT
Start: 2025-04-27 | End: 2025-04-29 | Stop reason: HOSPADM

## 2025-04-26 RX ADMIN — LIDOCAINE 1 PATCH: 4 PATCH TOPICAL at 20:48

## 2025-04-26 RX ADMIN — GABAPENTIN 100 MG: 100 CAPSULE ORAL at 20:48

## 2025-04-26 RX ADMIN — KETOROLAC TROMETHAMINE 15 MG: 15 INJECTION, SOLUTION INTRAMUSCULAR; INTRAVENOUS at 20:48

## 2025-04-26 ASSESSMENT — ENCOUNTER SYMPTOMS
SHORTNESS OF BREATH: 0
COUGH: 0
NAUSEA: 0
DIARRHEA: 0
ABDOMINAL PAIN: 0
VOMITING: 0
CHILLS: 0
FEVER: 0

## 2025-04-26 ASSESSMENT — FIBROSIS 4 INDEX: FIB4 SCORE: 0.93

## 2025-04-27 PROBLEM — D64.9 ANEMIA: Status: ACTIVE | Noted: 2025-04-27

## 2025-04-27 PROBLEM — M87.9 OSTEONECROSIS OF RIGHT HIP (HCC): Status: ACTIVE | Noted: 2025-04-27

## 2025-04-27 PROBLEM — Z71.89 ADVANCE CARE PLANNING: Status: ACTIVE | Noted: 2025-04-27

## 2025-04-27 PROBLEM — S32.401A CLOSED FRACTURE OF RIGHT ACETABULUM (HCC): Status: ACTIVE | Noted: 2025-04-26

## 2025-04-27 PROBLEM — R62.7 FAILURE TO THRIVE IN ADULT: Status: ACTIVE | Noted: 2025-04-27

## 2025-04-27 LAB
ALBUMIN SERPL BCP-MCNC: 3 G/DL (ref 3.2–4.9)
ALBUMIN/GLOB SERPL: 0.8 G/DL
ALP SERPL-CCNC: 128 U/L (ref 30–99)
ALT SERPL-CCNC: 25 U/L (ref 2–50)
ANION GAP SERPL CALC-SCNC: 12 MMOL/L (ref 7–16)
AST SERPL-CCNC: 20 U/L (ref 12–45)
BILIRUB SERPL-MCNC: 0.5 MG/DL (ref 0.1–1.5)
BUN SERPL-MCNC: 17 MG/DL (ref 8–22)
CALCIUM ALBUM COR SERPL-MCNC: 9.9 MG/DL (ref 8.5–10.5)
CALCIUM SERPL-MCNC: 9.1 MG/DL (ref 8.5–10.5)
CHLORIDE SERPL-SCNC: 103 MMOL/L (ref 96–112)
CO2 SERPL-SCNC: 21 MMOL/L (ref 20–33)
CREAT SERPL-MCNC: 0.52 MG/DL (ref 0.5–1.4)
ERYTHROCYTE [DISTWIDTH] IN BLOOD BY AUTOMATED COUNT: 45.1 FL (ref 35.9–50)
GFR SERPLBLD CREATININE-BSD FMLA CKD-EPI: 101 ML/MIN/1.73 M 2
GLOBULIN SER CALC-MCNC: 3.9 G/DL (ref 1.9–3.5)
GLUCOSE SERPL-MCNC: 103 MG/DL (ref 65–99)
HCT VFR BLD AUTO: 28.5 % (ref 37–47)
HCT VFR BLD AUTO: 29.6 % (ref 37–47)
HGB BLD-MCNC: 8.7 G/DL (ref 12–16)
HGB BLD-MCNC: 9 G/DL (ref 12–16)
MAGNESIUM SERPL-MCNC: 2.2 MG/DL (ref 1.5–2.5)
MCH RBC QN AUTO: 26.9 PG (ref 27–33)
MCHC RBC AUTO-ENTMCNC: 30.5 G/DL (ref 32.2–35.5)
MCV RBC AUTO: 88 FL (ref 81.4–97.8)
PLATELET # BLD AUTO: 427 K/UL (ref 164–446)
PMV BLD AUTO: 8.9 FL (ref 9–12.9)
POTASSIUM SERPL-SCNC: 4.1 MMOL/L (ref 3.6–5.5)
PROT SERPL-MCNC: 6.9 G/DL (ref 6–8.2)
RBC # BLD AUTO: 3.24 M/UL (ref 4.2–5.4)
SODIUM SERPL-SCNC: 136 MMOL/L (ref 135–145)
WBC # BLD AUTO: 7 K/UL (ref 4.8–10.8)

## 2025-04-27 PROCEDURE — 700102 HCHG RX REV CODE 250 W/ 637 OVERRIDE(OP): Performed by: INTERNAL MEDICINE

## 2025-04-27 PROCEDURE — A9270 NON-COVERED ITEM OR SERVICE: HCPCS | Performed by: INTERNAL MEDICINE

## 2025-04-27 PROCEDURE — 80053 COMPREHEN METABOLIC PANEL: CPT

## 2025-04-27 PROCEDURE — 700111 HCHG RX REV CODE 636 W/ 250 OVERRIDE (IP): Mod: JZ | Performed by: STUDENT IN AN ORGANIZED HEALTH CARE EDUCATION/TRAINING PROGRAM

## 2025-04-27 PROCEDURE — 700102 HCHG RX REV CODE 250 W/ 637 OVERRIDE(OP): Performed by: STUDENT IN AN ORGANIZED HEALTH CARE EDUCATION/TRAINING PROGRAM

## 2025-04-27 PROCEDURE — 36415 COLL VENOUS BLD VENIPUNCTURE: CPT

## 2025-04-27 PROCEDURE — 85014 HEMATOCRIT: CPT

## 2025-04-27 PROCEDURE — 85027 COMPLETE CBC AUTOMATED: CPT

## 2025-04-27 PROCEDURE — 99497 ADVNCD CARE PLAN 30 MIN: CPT | Performed by: INTERNAL MEDICINE

## 2025-04-27 PROCEDURE — 97162 PT EVAL MOD COMPLEX 30 MIN: CPT

## 2025-04-27 PROCEDURE — 83735 ASSAY OF MAGNESIUM: CPT

## 2025-04-27 PROCEDURE — A9270 NON-COVERED ITEM OR SERVICE: HCPCS | Performed by: STUDENT IN AN ORGANIZED HEALTH CARE EDUCATION/TRAINING PROGRAM

## 2025-04-27 PROCEDURE — 94760 N-INVAS EAR/PLS OXIMETRY 1: CPT

## 2025-04-27 PROCEDURE — 99222 1ST HOSP IP/OBS MODERATE 55: CPT | Performed by: STUDENT IN AN ORGANIZED HEALTH CARE EDUCATION/TRAINING PROGRAM

## 2025-04-27 PROCEDURE — 700111 HCHG RX REV CODE 636 W/ 250 OVERRIDE (IP): Performed by: INTERNAL MEDICINE

## 2025-04-27 PROCEDURE — G0378 HOSPITAL OBSERVATION PER HR: HCPCS

## 2025-04-27 PROCEDURE — 85018 HEMOGLOBIN: CPT

## 2025-04-27 PROCEDURE — 99233 SBSQ HOSP IP/OBS HIGH 50: CPT | Mod: 25 | Performed by: INTERNAL MEDICINE

## 2025-04-27 RX ORDER — PROCHLORPERAZINE MALEATE 10 MG
5 TABLET ORAL EVERY 8 HOURS PRN
Status: DISCONTINUED | OUTPATIENT
Start: 2025-04-27 | End: 2025-04-29 | Stop reason: HOSPADM

## 2025-04-27 RX ORDER — HYDROMORPHONE HYDROCHLORIDE 1 MG/ML
0.25 INJECTION, SOLUTION INTRAMUSCULAR; INTRAVENOUS; SUBCUTANEOUS
Status: DISCONTINUED | OUTPATIENT
Start: 2025-04-27 | End: 2025-04-29 | Stop reason: HOSPADM

## 2025-04-27 RX ORDER — SUMATRIPTAN SUCCINATE 25 MG/1
50 TABLET ORAL
Status: DISCONTINUED | OUTPATIENT
Start: 2025-04-27 | End: 2025-04-29 | Stop reason: HOSPADM

## 2025-04-27 RX ORDER — OXYCODONE HYDROCHLORIDE 5 MG/1
5 TABLET ORAL
Status: DISCONTINUED | OUTPATIENT
Start: 2025-04-27 | End: 2025-04-29 | Stop reason: HOSPADM

## 2025-04-27 RX ORDER — OXYCODONE HYDROCHLORIDE 5 MG/1
2.5 TABLET ORAL
Status: DISCONTINUED | OUTPATIENT
Start: 2025-04-27 | End: 2025-04-29 | Stop reason: HOSPADM

## 2025-04-27 RX ORDER — SUMATRIPTAN 50 MG/1
50 TABLET, FILM COATED ORAL
COMMUNITY

## 2025-04-27 RX ORDER — ONDANSETRON 4 MG/1
4 TABLET, ORALLY DISINTEGRATING ORAL EVERY 8 HOURS PRN
Status: DISCONTINUED | OUTPATIENT
Start: 2025-04-27 | End: 2025-04-29 | Stop reason: HOSPADM

## 2025-04-27 RX ORDER — FERROUS SULFATE 325(65) MG
325 TABLET ORAL
Status: DISCONTINUED | OUTPATIENT
Start: 2025-04-27 | End: 2025-04-29 | Stop reason: HOSPADM

## 2025-04-27 RX ADMIN — PROCHLORPERAZINE MALEATE 5 MG: 10 TABLET ORAL at 16:01

## 2025-04-27 RX ADMIN — ENOXAPARIN SODIUM 40 MG: 100 INJECTION SUBCUTANEOUS at 18:17

## 2025-04-27 RX ADMIN — TRAMADOL HYDROCHLORIDE 50 MG: 50 TABLET, COATED ORAL at 08:05

## 2025-04-27 RX ADMIN — ONDANSETRON 4 MG: 4 TABLET, ORALLY DISINTEGRATING ORAL at 09:01

## 2025-04-27 RX ADMIN — ACETAMINOPHEN 650 MG: 325 TABLET ORAL at 18:53

## 2025-04-27 RX ADMIN — FERROUS SULFATE TAB 325 MG (65 MG ELEMENTAL FE) 325 MG: 325 (65 FE) TAB at 12:30

## 2025-04-27 RX ADMIN — TRAMADOL HYDROCHLORIDE 50 MG: 50 TABLET, COATED ORAL at 15:48

## 2025-04-27 ASSESSMENT — PAIN DESCRIPTION - PAIN TYPE
TYPE: ACUTE PAIN

## 2025-04-27 ASSESSMENT — GAIT ASSESSMENTS
GAIT LEVEL OF ASSIST: SUPERVISED
DEVIATION: STEP TO
ASSISTIVE DEVICE: FRONT WHEEL WALKER
DISTANCE (FEET): 50

## 2025-04-27 ASSESSMENT — COGNITIVE AND FUNCTIONAL STATUS - GENERAL
DRESSING REGULAR LOWER BODY CLOTHING: A LOT
WALKING IN HOSPITAL ROOM: A LOT
STANDING UP FROM CHAIR USING ARMS: A LITTLE
MOVING FROM LYING ON BACK TO SITTING ON SIDE OF FLAT BED: A LOT
MOVING TO AND FROM BED TO CHAIR: A LOT
TURNING FROM BACK TO SIDE WHILE IN FLAT BAD: A LITTLE
WALKING IN HOSPITAL ROOM: A LITTLE
PERSONAL GROOMING: A LOT
CLIMB 3 TO 5 STEPS WITH RAILING: TOTAL
DAILY ACTIVITIY SCORE: 14
CLIMB 3 TO 5 STEPS WITH RAILING: A LITTLE
STANDING UP FROM CHAIR USING ARMS: A LOT
MOVING FROM LYING ON BACK TO SITTING ON SIDE OF FLAT BED: A LITTLE
SUGGESTED CMS G CODE MODIFIER MOBILITY: CK
TOILETING: A LOT
TURNING FROM BACK TO SIDE WHILE IN FLAT BAD: A LOT
MOVING TO AND FROM BED TO CHAIR: A LITTLE
SUGGESTED CMS G CODE MODIFIER MOBILITY: CL
DRESSING REGULAR UPPER BODY CLOTHING: A LOT
HELP NEEDED FOR BATHING: A LOT
MOBILITY SCORE: 11
MOBILITY SCORE: 18
SUGGESTED CMS G CODE MODIFIER DAILY ACTIVITY: CK

## 2025-04-27 ASSESSMENT — ENCOUNTER SYMPTOMS
HEARTBURN: 0
PALPITATIONS: 0
ABDOMINAL PAIN: 0
DOUBLE VISION: 0
DIZZINESS: 0
SHORTNESS OF BREATH: 0
HEADACHES: 0
COUGH: 0
MYALGIAS: 1
NERVOUS/ANXIOUS: 0
BLURRED VISION: 0
FEVER: 0
CHILLS: 0
VOMITING: 0

## 2025-04-27 ASSESSMENT — LIFESTYLE VARIABLES
ALCOHOL_USE: NO
HAVE PEOPLE ANNOYED YOU BY CRITICIZING YOUR DRINKING: NO
TOTAL SCORE: 0
SUBSTANCE_ABUSE: 0
AVERAGE NUMBER OF DAYS PER WEEK YOU HAVE A DRINK CONTAINING ALCOHOL: 0
TOTAL SCORE: 0
EVER HAD A DRINK FIRST THING IN THE MORNING TO STEADY YOUR NERVES TO GET RID OF A HANGOVER: NO
ON A TYPICAL DAY WHEN YOU DRINK ALCOHOL HOW MANY DRINKS DO YOU HAVE: 0
HAVE YOU EVER FELT YOU SHOULD CUT DOWN ON YOUR DRINKING: NO
TOTAL SCORE: 0
EVER FELT BAD OR GUILTY ABOUT YOUR DRINKING: NO
HOW MANY TIMES IN THE PAST YEAR HAVE YOU HAD 5 OR MORE DRINKS IN A DAY: 0
CONSUMPTION TOTAL: NEGATIVE

## 2025-04-27 ASSESSMENT — PATIENT HEALTH QUESTIONNAIRE - PHQ9
1. LITTLE INTEREST OR PLEASURE IN DOING THINGS: NOT AT ALL
SUM OF ALL RESPONSES TO PHQ9 QUESTIONS 1 AND 2: 0
2. FEELING DOWN, DEPRESSED, IRRITABLE, OR HOPELESS: NOT AT ALL

## 2025-04-27 ASSESSMENT — SOCIAL DETERMINANTS OF HEALTH (SDOH)
WITHIN THE PAST 12 MONTHS, YOU WORRIED THAT YOUR FOOD WOULD RUN OUT BEFORE YOU GOT THE MONEY TO BUY MORE: NEVER TRUE
WITHIN THE PAST 12 MONTHS, THE FOOD YOU BOUGHT JUST DIDN'T LAST AND YOU DIDN'T HAVE MONEY TO GET MORE: NEVER TRUE
WITHIN THE LAST YEAR, HAVE YOU BEEN KICKED, HIT, SLAPPED, OR OTHERWISE PHYSICALLY HURT BY YOUR PARTNER OR EX-PARTNER?: NO
WITHIN THE LAST YEAR, HAVE YOU BEEN AFRAID OF YOUR PARTNER OR EX-PARTNER?: NO
WITHIN THE LAST YEAR, HAVE YOU BEEN HUMILIATED OR EMOTIONALLY ABUSED IN OTHER WAYS BY YOUR PARTNER OR EX-PARTNER?: NO
IN THE PAST 12 MONTHS, HAS THE ELECTRIC, GAS, OIL, OR WATER COMPANY THREATENED TO SHUT OFF SERVICE IN YOUR HOME?: NO
WITHIN THE LAST YEAR, HAVE TO BEEN RAPED OR FORCED TO HAVE ANY KIND OF SEXUAL ACTIVITY BY YOUR PARTNER OR EX-PARTNER?: NO

## 2025-04-27 ASSESSMENT — FIBROSIS 4 INDEX: FIB4 SCORE: 0.64

## 2025-04-27 NOTE — ED NOTES
Messaged Dr Ferguson regarding no surgery needed and diet order, verbal order for Regular diet.  Diet ordered.

## 2025-04-27 NOTE — ED NOTES
Pt resting in bed with equal chest rise and fall observed. No pt needs at this time. No s/s of distress noted.

## 2025-04-27 NOTE — THERAPY
Physical Therapy   Initial Evaluation     Patient Name: Shannan Barry  Age:  69 y.o., Sex:  female  Medical Record #: [id number]  Today's Date: 4/27/2025     Precautions  Precautions: Toe Touch Weight Bearing Right Lower Extremity    Assessment  Patient is 69 y.o. 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   Pt resides with family in a single level home w/o steps to enter.   Pt and family educated on TTWB and use of FWW. Pt able to demonstrate TTWB and ambulate for functional in home distances.   Pt has excellent family support. Pain well controlled at this time.       Plan    Physical Therapy Initial Treatment Plan   Duration: (P) Discharge Needs Only    DC Equipment Recommendations: (P) Front-Wheel Walker  Discharge Recommendations: (P) Anticipate that the patient will have no further physical therapy needs after discharge from the hospital        Services   Is patient using  services for this encounter?   (daughter present and prefers to interprete.)   Language Interpreted Indonesian   Initial Contact Note    Initial Contact Note Order Received and Verified, Physical Therapy Evaluation in Progress with Full Report to Follow.   Precautions   Precautions Toe Touch Weight Bearing Right Lower Extremity   Pain 0 - 10 Group   Location Hip   Location Orientation Right   Pain Rating Scale (NPRS) 5   Therapist Pain Assessment During Activity;5   Prior Living Situation   Prior Services None   Housing / Facility 1 Story House   Steps Into Home 0   Steps In Home 0   Equipment Owned None   Lives with - Patient's Self Care Capacity Spouse;Adult Children   Prior Level of Functional Mobility   Bed Mobility Independent   Transfer Status Independent   Ambulation Independent   Ambulation Distance community   Assistive Devices Used None   Stairs Independent   Comments works at [employer]   History of Falls   History of Falls  Yes   Date of Last Fall 04/25/25   Cognition    Level of Consciousness Alert   Balance Assessment   Sitting Balance (Static) Good   Sitting Balance (Dynamic) Good   Standing Balance (Static) Good   Standing Balance (Dynamic) Fair +   Weight Shift Sitting Good   Weight Shift Standing Good   Comments good WS to BUE for TTWB precaution.   Bed Mobility    Supine to Sit Independent   Sit to Supine Independent   Scooting Independent   Gait Analysis   Gait Level Of Assist Supervised   Assistive Device Front Wheel Walker   Distance (Feet) 50   # of Times Distance was Traveled 1   Deviation Step To   Weight Bearing Status TTWB   Comments pt able to maintain TTWB with FWW.  (daughter present)   Functional Mobility   Sit to Stand Supervised   Bed, Chair, Wheelchair Transfer Supervised   6 Clicks Assessment - How much HELP from from another person do you currently need... (If the patient hasn't done an activity recently, how much help from another person do you think he/she would need if he/she tried?)   Turning from your back to your side while in a flat bed without using bedrails? 3   Moving from lying on your back to sitting on the side of a flat bed without using bedrails? 3   Moving to and from a bed to a chair (including a wheelchair)? 3   Standing up from a chair using your arms (e.g., wheelchair, or bedside chair)? 3   Walking in hospital room? 3   Climbing 3-5 steps with a railing? 3   6 clicks Mobility Score 18   Education Group   Education Provided Role of Physical Therapist;Gait Training;Use of Assistive Device;Weight Bearing Precautions   Role of Physical Therapist Patient Response Patient;Acceptance;Explanation;Verbal Demonstration;Family   Gait Training Patient Response Patient;Acceptance;Explanation;Action Demonstration   Use of Assistive Device Patient Response Patient;Acceptance;Explanation;Action Demonstration   Weight Bearing Precautions Patient Response  Patient;Family;Acceptance;Demonstration;Explanation;Action Demonstration;Verbal Demonstration   Physical Therapy Initial Treatment Plan    Duration Discharge Needs Only   Anticipated Discharge Equipment and Recommendations   DC Equipment Recommendations Front-Wheel Walker   Discharge Recommendations Anticipate that the patient will have no further physical therapy needs after discharge from the hospital   Interdisciplinary Plan of Care Collaboration   IDT Collaboration with  Nursing;Family / Caregiver   Patient Position at End of Therapy In Bed;Tray Table within Reach;Call Light within Reach;Phone within Reach;Family / Friend in Room   Session Information   Date / Session Number  4/27 DC needs only.

## 2025-04-27 NOTE — ASSESSMENT & PLAN NOTE
4/28: I discussed with patient for at least 16 minutes further goals of care.  The patient was interested in filling out a POLST form.  We filled out a POLST form together at bedside.  The patient would like to have CPR attempted.  The patient like to have full treatment options available including intubation, mechanical ventilation and transferred to the ICU as needed.  The patient like to have long-term artificial nutrition/feeding tube as needed.  The patient has decisional capacity.  The patient has signed the POLST form.  I have given the POLST form to nursing to upload to the system.

## 2025-04-27 NOTE — ED TRIAGE NOTES
Chief Complaint   Patient presents with    Hip Pain     Right hip pain radiating from the right buttock to the right knee.      /71   Pulse 94   Temp 36.7 °C (98.1 °F) (Temporal)   Resp 18   Wt 44.5 kg (98 lb)   SpO2 98%   BMI 17.92 kg/m²

## 2025-04-27 NOTE — ED PROVIDER NOTES
ED Provider Note    CHIEF COMPLAINT  Chief Complaint   Patient presents with    Hip Pain     Right hip pain radiating from the right buttock to the right knee.        EXTERNAL RECORDS REVIEWED  Patient notes from urgent care, patient has history of back pain, seen multiple times with the past couple days for hip pain, inability to walk, received dose of Toradol, steroids which she did not tolerate    HPI/ROS  LIMITATION TO HISTORY   Language: Bengali,  offered but patient preferred family to translate at the bedside, family felt comfortable translating  OUTSIDE HISTORIAN(S):  Family daughter who is at the bedside    Shannan Barry is a 69 y.o. female who presents to the emergency department with chief complaint of lower back pain, groin and right hip pain, inability to walk for 2 days.  Patient is bent over urgent care multiple times for similar pain, family states that they have had to carry patient around secondary to her inability to walk.  She denies any numbness, tingling, incontinence, saddle anesthesia.  Does not take any anticoagulants or immunosuppressants.  States that Toradol has worked for her pain in the past, received Toradol at urgent care multiple times without significant improvement, denies any recent falls, trauma.    PAST MEDICAL HISTORY   has a past medical history of Hypertension.    SURGICAL HISTORY   has a past surgical history that includes no pertinent past surgical history.    FAMILY HISTORY  History reviewed. No pertinent family history.    SOCIAL HISTORY  Social History     Tobacco Use    Smoking status: Never    Smokeless tobacco: Never   Vaping Use    Vaping status: Never Used   Substance and Sexual Activity    Alcohol use: No    Drug use: No    Sexual activity: Not on file       CURRENT MEDICATIONS  Home Medications       Reviewed by Ha Pierce R.N. (Registered Nurse) on 04/26/25 at 1956  Med List Status: Not Addressed     Medication Last Dose Status    acetaminophen (TYLENOL) 325 MG Tab  Active   FEROSUL 325 (65 Fe) MG tablet  Active   omeprazole (PRILOSEC) 20 MG delayed-release capsule  Active   predniSONE (DELTASONE) 20 MG Tab  Active   traMADol (ULTRAM) 50 MG Tab  Active                    ALLERGIES  Allergies   Allergen Reactions    Naproxen Unspecified     BLOOD PRESSURE ISSUES       PHYSICAL EXAM  VITAL SIGNS: /71   Pulse 94   Temp 36.7 °C (98.1 °F) (Temporal)   Resp 18   Wt 44.5 kg (98 lb)   SpO2 98%   BMI 17.92 kg/m²    Constitutional: Awake and alert  HENT: Normal inspection  Eyes: Normal inspection  Neck: Grossly normal range of motion.  Cardiovascular: Normal heart rate, Normal rhythm.  Symmetric peripheral pulses.   Thorax & Lungs: No respiratory distress, No wheezing, No rales, No rhonchi, No chest tenderness.   Abdomen: Bowel sounds normal, soft, non-distended, nontender, no pulsatile abdominal mass  Skin: No obvious rash.  Back: No midline cervical, thoracic or lumbar spinal tenderness, does have tenderness palpation of the right SI joint  Extremities: Tenderness palpation over the right hip, severely limited and painful internal and external rotation of the right hip, distally is neurovascularly intact  Neurologic: Grossly normal, normal reflexes, normal sensation, normal cranial nerves, gait deferred  Psychiatric: Normal for situation      RADIOLOGY/PROCEDURES   I have independently interpreted the diagnostic imaging associated with this visit and am waiting the final reading from the radiologist.   My preliminary interpretation is as follows: Patient had CT scan and x-ray concerning for osteonecrosis of right hip as well as superior acetabular fracture, severe scoliosis with multi degenerative changes    Radiologist interpretation:  DX-HIP-COMPLETE - UNILATERAL 2+ RIGHT   Final Result      New flattening of the right femoral head is consistent with subchondral collapse.      CT-HIP W/O PLUS RECONS RIGHT   Final Result      1.  There  is subtle fracturing of the right acetabulum superiorly.   2.  There is also subchondral fracturing and subchondral collapse of the right femoral head which may be due to underlying osteonecrosis.      CT-LSPINE W/O PLUS RECONS   Final Result      1.  Scoliosis and severe multilevel degenerative disease.   2.  Right-sided pars defect at L5.   3.  No definite acute fracture of the lumbar spine.          COURSE & MEDICAL DECISION MAKING    ASSESSMENT, COURSE AND PLAN  Care Narrative: Patient is a 69-year-old female presenting to the emergency department with lower back pain, right hip pain, inability to walk.  Patient is neurovascularly intact, does have point tenderness in the SI joint, tenderness over the right hip, significant pain with internal and external rotation.  Distally she is neurovascularly intact.  No red flag symptoms for cauda equina or spinal cord compression.  Will obtain imaging to evaluate for occult fracture.    Did show suspected osteonecrosis of right hip as well as superior nondisplaced acetabular fracture.  Patient also has some significant scoliosis.  Will reach out to orthopedic surgery for further recommendations.    9:49 PM  Discussed case with orthopedic surgeon Dr. Mackay who recommends admission to the hospital for pain control, PT OT evaluation, nonweightbearing on that side.  He feels that the acetabular fracture does not need immediate or urgent surgery.  He feels that the osteonecrosis may be something that has been present over time.  Patient made likely need a eventual total hip arthroplasty however contraindicated due to the acute for subacute acetabular fracture.  Will reach out to the hospitalist.    This case with hospitalist Dr. Lindsey who agrees to admit the patient.            ADDITIONAL PROBLEMS MANAGED  Osteonecrosis of right hip, right acetabular fracture    DISPOSITION AND DISCUSSIONS  I have discussed management of the patient with the following physicians and REDD's:  Orthopedic surgeon Dr. Mackay.  Hospitalist Dr. Lindsey    Discussion of management with other [facility] or appropriate source(s): None       FINAL DIAGNOSIS  1. Closed nondisplaced fracture of right acetabulum, unspecified portion of acetabulum, initial encounter (HCC) Acute   2. Osteonecrosis of right hip (HCC) Acute        Electronically signed by: Ace Prescott M.D., 4/26/2025 8:09 PM

## 2025-04-27 NOTE — DISCHARGE PLANNING
ER CM met with pt and tonia Gonzalez at bedside. AOX4. Pleasant. She lives in 1 story apt with spouse David Hernandez who is retired. Son Trevor is supportive and Dtr Lisa lives with them in apartment.  Address verified.  PCP Dr Monster Maldonado. RX [facility]. No o2 or DME at home. She will likely have DC need of DME and possibly Home health. PT OT eval pending  Care Transition Team Assessment    Information Source  Orientation Level: Oriented X4  Information Given By: Patient, Relative  Informant's Name: Shannan/Lisa  Who is responsible for making decisions for patient? : Patient         Elopement Risk  Legal Hold: No    Interdisciplinary Discharge Planning  Primary Care Physician: Monster Maldonado MD  Lives with - Patient's Self Care Capacity: Spouse  Support Systems: Spouse / Significant Other, Children  Housing / Facility: 1 Story Apartment / Condo (first floor apt)  Do You Take your Prescribed Medications Regularly: Yes  Able to Return to Previous ADL's: Future Time w/Therapy  Mobility Issues: Yes  Prior Services: None    Discharge Preparedness  What is your plan after discharge?: Home with help  What are your discharge supports?: Child, Spouse  Prior Functional Level: Ambulatory, Independent with Activities of Daily Living, Independent with Medication Management    Functional Assesment  Prior Functional Level: Ambulatory, Independent with Activities of Daily Living, Independent with Medication Management    Finances  Prescription Coverage: Yes                   Domestic Abuse  Have you ever been the victim of abuse or violence?: No              Anticipated Discharge Information  Discharge Disposition: Discharged to home/self care (01)

## 2025-04-27 NOTE — CARE PLAN
The patient is Stable - Low risk of patient condition declining or worsening    Shift Goals  Clinical Goals: remain free from falls, manage pain at or above 3/10 with medication per MAR, mobilize as tolerated to chair for meals, monitor CMS  Patient Goals: rest, manage pain, mobilize as tolerated  Family Goals: Stay updated on POC    Progress made toward(s) clinical / shift goals:  pt remained free from falls, pain managed with medication per MAR, pt mobilized with PT, CMS monitored    Problem: Knowledge Deficit - Standard  Goal: Patient and family/care givers will demonstrate understanding of plan of care, disease process/condition, diagnostic tests and medications  Outcome: Progressing     Problem: Psychosocial  Goal: Patient's ability to verbalize feelings about condition will improve  Outcome: Progressing     Problem: Communication  Goal: The ability to communicate needs accurately and effectively will improve  Outcome: Progressing     Problem: Nutrition  Goal: Patient's nutritional and fluid intake will be adequate or improve  Outcome: Progressing     Problem: Urinary Elimination  Goal: Establish and maintain regular urinary output  Outcome: Progressing     Problem: Bowel Elimination  Goal: Establish and maintain regular bowel function  Outcome: Progressing     Problem: Mobility  Goal: Patient's capacity to carry out activities will improve  Outcome: Progressing     Problem: Self Care  Goal: Patient will have the ability to perform ADLs independently or with assistance (bathe, groom, dress, toilet and feed)  Outcome: Progressing     Problem: Infection - Standard  Goal: Patient will remain free from infection  Outcome: Progressing     Problem: Pain - Standard  Goal: Alleviation of pain or a reduction in pain to the patient’s comfort goal  Outcome: Progressing       Patient is not progressing towards the following goals:

## 2025-04-27 NOTE — ED NOTES
Medication history reviewed with pt. Med rec is complete.  Allergies reviewed, per pt  Interviewed pt with daughter at bedside with permission from pt.    Pts daughter translated for pt.  Pt only took one dose of her PREDNISONE and stopped it made her throw up.      Pt reports that she did not  her prescription for OMEPRAZOLE 20MG  or INDOMETHACIN 50MG that was prescribed to her on 3/6/2025    Patient has not had any outpatient antibiotics in the last 30 days.    Pt is not on any anticoagulants      Dispense history available in EPIC? YES

## 2025-04-27 NOTE — PROGRESS NOTES
4 Eyes Skin Assessment Completed by RADHA Braga and RADHA Flores.    Head WDL  Ears WDL  Nose WDL  Mouth WDL  Neck WDL  Breast/Chest WDL  Shoulder Blades WDL  Spine WDL  (R) Arm/Elbow/Hand Redness and Blanching  (L) Arm/Elbow/Hand Redness and Blanching  Abdomen WDL  Groin WDL  Scrotum/Coccyx/Buttocks Discoloration; brown  (R) Leg WDL  (L) Leg WDL  (R) Heel/Foot/Toe Redness and Blanching; dry  (L) Heel/Foot/Toe Redness and Blanching; dry          Devices In Places Blood Pressure Cuff and Pulse Ox      Interventions In Place Pillows    Possible Skin Injury No    Pictures Uploaded Into Epic N/A  Wound Consult Placed N/A  RN Wound Prevention Protocol Ordered No

## 2025-04-27 NOTE — PROGRESS NOTES
"Hospital Medicine Daily Progress Note    Date of Service  4/27/2025    Chief Complaint  Shannan Barry is a 69 y.o. female admitted 4/26/2025 with hip pain.    Hospital Course  As per chart review:  \"69 y.o. female who presented 4/26/2025 with hip pain.  Complaining of right groin and hip pain that is worsening over the past week, over the past 2 days she has not been able to walk due to the pain.  Family states they have to carry her around because she cannot walk.  Denies any recent falls or trauma.  Denies numbness, tingling, stool or bladder incontinence.  No fever/chills.     In the ED afebrile, hemodynamically stable.  CT of her right hip showed acetabular fracture and concern for osteonecrosis.  Denies long-term steroid use, smoking or alcohol use.\"    Interval Problem Update  4/27: Patient seen at bedside this morning.  Complaining of hip pain.  Orthopedic surgery evaluated the patient this morning and are not recommending further surgical treatment at this time.  Pending PT/OT evaluation and pain management.    I have discussed this patient's plan of care and discharge plan at IDT rounds today with Case Management, Nursing, Nursing leadership, and other members of the IDT team.    Consultants/Specialty  orthopedics    Code Status  Full Code    Disposition  The patient is not medically cleared for discharge to home or a post-acute facility.        Review of Systems  Review of Systems   Constitutional:  Positive for malaise/fatigue. Negative for chills and fever.   HENT:  Negative for hearing loss and nosebleeds.    Eyes:  Negative for blurred vision and double vision.   Respiratory:  Negative for cough and shortness of breath.    Cardiovascular:  Negative for chest pain and palpitations.   Gastrointestinal:  Negative for abdominal pain, heartburn and vomiting.   Genitourinary:  Negative for dysuria and urgency.   Musculoskeletal:  Positive for joint pain and myalgias.   Skin:  Negative for itching " and rash.   Neurological:  Negative for dizziness and headaches.   Psychiatric/Behavioral:  Negative for substance abuse. The patient is not nervous/anxious.    All other systems reviewed and are negative.       Physical Exam  Temp:  [36.4 °C (97.5 °F)-36.7 °C (98.1 °F)] 36.4 °C (97.5 °F)  Pulse:  [68-94] 81  Resp:  [16-18] 16  BP: (116-153)/(58-73) 116/59  SpO2:  [93 %-100 %] 95 %    Physical Exam  Vitals and nursing note reviewed.   Constitutional:       General: She is not in acute distress.     Appearance: She is ill-appearing.      Comments: Thin, frail, cachectic   HENT:      Head: Normocephalic and atraumatic.      Right Ear: External ear normal.      Left Ear: External ear normal.      Mouth/Throat:      Pharynx: No oropharyngeal exudate or posterior oropharyngeal erythema.   Eyes:      General:         Right eye: No discharge.         Left eye: No discharge.   Cardiovascular:      Rate and Rhythm: Normal rate and regular rhythm.      Pulses: Normal pulses.      Heart sounds: No murmur heard.     No gallop.   Pulmonary:      Effort: Pulmonary effort is normal. No respiratory distress.      Breath sounds: Normal breath sounds. No wheezing or rhonchi.   Abdominal:      General: Bowel sounds are normal. There is no distension.      Palpations: Abdomen is soft.      Tenderness: There is no abdominal tenderness. There is no guarding.   Musculoskeletal:         General: Tenderness present.      Cervical back: Normal range of motion and neck supple. No tenderness.   Skin:     General: Skin is warm and dry.   Neurological:      Mental Status: She is alert and oriented to person, place, and time.   Psychiatric:         Mood and Affect: Mood normal.         Behavior: Behavior normal.         Fluids    Intake/Output Summary (Last 24 hours) at 4/27/2025 1210  Last data filed at 4/27/2025 1100  Gross per 24 hour   Intake 150 ml   Output 450 ml   Net -300 ml        Laboratory  Recent Labs     04/27/25  0533   WBC 7.0    RBC 3.24*   HEMOGLOBIN 8.7*   HEMATOCRIT 28.5*   MCV 88.0   MCH 26.9*   MCHC 30.5*   RDW 45.1   PLATELETCT 427   MPV 8.9*     Recent Labs     04/27/25  0533   SODIUM 136   POTASSIUM 4.1   CHLORIDE 103   CO2 21   GLUCOSE 103*   BUN 17   CREATININE 0.52   CALCIUM 9.1                   Imaging  DX-HIP-COMPLETE - UNILATERAL 2+ RIGHT   Final Result      New flattening of the right femoral head is consistent with subchondral collapse.      CT-HIP W/O PLUS RECONS RIGHT   Final Result      1.  There is subtle fracturing of the right acetabulum superiorly.   2.  There is also subchondral fracturing and subchondral collapse of the right femoral head which may be due to underlying osteonecrosis.      CT-LSPINE W/O PLUS RECONS   Final Result      1.  Scoliosis and severe multilevel degenerative disease.   2.  Right-sided pars defect at L5.   3.  No definite acute fracture of the lumbar spine.           Assessment/Plan  * Closed fracture of right acetabulum (HCC)- (present on admission)  Assessment & Plan  Has been complaining of worsening right hip pain over the past week, the past couple days she has been unable to walk  Also seems to have osteonecrosis of the right hip.    4/27: Orthopedic surgery not recommending surgery at this time.  PT/OT evaluation.  Pain management.    Failure to thrive in adult  Assessment & Plan  We will consult Dietary  BBMI of 17.92    Anemia  Assessment & Plan  4/27: Hemoglobin this morning was 8.7, at the beginning of last month was 10.4.  Will repeat hemoglobin to make sure it is not downtrending.  Continue to monitor closely.    Osteonecrosis of right hip (HCC)- (present on admission)  Assessment & Plan  Findings suggestive of osteonecrosis on CT scan  Denies long-term steroid use, tobacco/alcohol/drug use.  Does not take any medications    4/27: Orthopedic surgery not recommending surgery at this time.  PT/OT evaluation.  Pain management.    Advance care planning  Assessment & Plan  4/27: I  discussed with patient for at least 16 minutes further goals of care.  This included CODE STATUS which includes full code and DNR/DNI.  The patient would like to be full code.  We also discussed further treatment goals.  For now the patient would like to have full treatment options available.  This includes invasive or noninvasive procedures as needed.  In the event that the patient cannot make decisions for herself she would like her daughter Lisa to make decisions for her.         VTE prophylaxis: Lovenox    I have performed a physical exam and reviewed and updated ROS and Plan today (4/27/2025). In review of yesterday's note (4/26/2025), there are no changes except as documented above.      I spend at least 51 minutes providing care for this patient.  This included face-to-face interview, physical examination.  Review of lab work including CBC, CMP, magnesium.  Discussing with multidisciplinary team including case management, nursing staff and pharmacy.  Creating plan of care, reviewing orders.    Moreover, I discussed with patient for at least 16 minutes further goals of care.  This included CODE STATUS which includes full code and DNR/DNI.  The patient would like to be full code.  We also discussed further treatment goals.  For now the patient would like to have full treatment options available.  This includes invasive or noninvasive procedures as needed.  In the event that the patient cannot make decisions for herself she would like her daughter Lisa to make decisions for her.

## 2025-04-27 NOTE — ED NOTES
Report received from NOC RN, POC discussed, walking rounds completed, pt up at side of bed, bedside commode provided.  No s/s distress, call light within reach and will continue to monitor.

## 2025-04-27 NOTE — DISCHARGE PLANNING
Patient status updated to OBSERVATION per attending physician determination, Dr. Rinku Sorensen, and  committee MD secondary review, Dr. Varinder Craft. Patient Status Update completed.

## 2025-04-27 NOTE — DISCHARGE PLANNING
Code 44 Form 2 provided to pt with her daughter available to translate. Understood and agreeable. No concerns

## 2025-04-27 NOTE — ASSESSMENT & PLAN NOTE
Has been complaining of worsening right hip pain over the past week, the past couple days she has been unable to walk  Also seems to have osteonecrosis of the right hip.    4/27: Orthopedic surgery not recommending surgery at this time.  PT/OT evaluation.  Pain management.    4/28: Orthopedic surgery not recommending surgery at this time.  I will order HH. Continue pain management.  I have added scheduled Tylenol and we will do as needed oxycodone.  If needed we will add further pain medications including gabapentin.

## 2025-04-27 NOTE — CONSULTS
After reviewing The patient's imaging appears to be some early osteonecrosis of the femoral head and a very subtle cortical irregularity concerning for fracture of the superior aspect of the acetabulum.  At this time given the patient has had significant pain control issues has been in and out of urgent cares in the emergency room I recommend admission for PT OT and pain control.  I will have trauma team review the imaging to ensure there is nothing surgically that needs to happen.  Recommended a nonweightbearing status on the right lower extremity.  I will see her tomorrow to discuss the results in detail.

## 2025-04-27 NOTE — ASSESSMENT & PLAN NOTE
Findings suggestive of osteonecrosis on CT scan  Denies long-term steroid use, tobacco/alcohol/drug use.  Does not take any medications    4/28: Orthopedic surgery not recommending surgery at this time.  I will order HH. Continue pain management.  I have added scheduled Tylenol and we will do as needed oxycodone.  If needed we will add further pain medications including gabapentin.

## 2025-04-27 NOTE — H&P
hospital Medicine History & Physical Note    Date of Service  4/26/2025    Primary Care Physician  Monster Maldonado M.D.    Consultants  orthopedics    Code Status  Full Code    Chief Complaint  Chief Complaint   Patient presents with    Hip Pain     Right hip pain radiating from the right buttock to the right knee.        History of Presenting Illness  Shannan Barry is a 69 y.o. female who presented 4/26/2025 with hip pain.  Complaining of right groin and hip pain that is worsening over the past week, over the past 2 days she has not been able to walk due to the pain.  Family states they have to carry her around because she cannot walk.  Denies any recent falls or trauma.  Denies numbness, tingling, stool or bladder incontinence.  No fever/chills.    In the ED afebrile, hemodynamically stable.  CT of her right hip showed acetabular fracture and concern for osteonecrosis.  Denies long-term steroid use, smoking or alcohol use.    I discussed the plan of care with patient, family, bedside RN, and EDP .    Review of Systems  Review of Systems   Constitutional:  Negative for chills and fever.   Respiratory:  Negative for cough and shortness of breath.    Cardiovascular:  Negative for chest pain.   Gastrointestinal:  Negative for abdominal pain, diarrhea, nausea and vomiting.   Genitourinary:  Negative for dysuria and urgency.   Musculoskeletal:  Positive for joint pain.       Past Medical History   has a past medical history of Hypertension.    Surgical History   has a past surgical history that includes no pertinent past surgical history.     Family History  Family history reviewed with patient. There is no family history that is pertinent to the chief complaint.     Social History   reports that she has never smoked. She has never used smokeless tobacco. She reports that she does not drink alcohol and does not use drugs.    Allergies  Allergies   Allergen Reactions    Naproxen Unspecified     BLOOD  "PRESSURE ISSUES       Medications  Prior to Admission Medications   Prescriptions Last Dose Informant Patient Reported? Taking?   FEROSUL 325 (65 Fe) MG tablet  Patient, Family Member Yes No   Sig: Take 325 mg by mouth every 3 days.   acetaminophen (TYLENOL) 325 MG Tab  Patient, Family Member Yes No   Sig: Take 650 mg by mouth every 8 hours as needed for Moderate Pain. Taking with the tramadol   omeprazole (PRILOSEC) 20 MG delayed-release capsule   No No   Sig: Take 1 Capsule by mouth every day.   predniSONE (DELTASONE) 20 MG Tab   No No   Sig: Take 2 Tablets by mouth every day for 5 days.   traMADol (ULTRAM) 50 MG Tab  Patient, Family Member Yes No   Sig: Take 50 mg by mouth every 8 hours as needed for Moderate Pain.      Facility-Administered Medications: None       Physical Exam  Temp:  [36.7 °C (98.1 °F)] 36.7 °C (98.1 °F)  Pulse:  [68-94] 68  Resp:  [16-18] 18  BP: (121-153)/(58-71) 125/58  SpO2:  [96 %-100 %] 97 %  Blood Pressure : 121/71   Temperature: 36.7 °C (98.1 °F)   Pulse: 82   Respiration: 18   Pulse Oximetry: 98 %       Physical Exam  HENT:      Head: Normocephalic and atraumatic.      Mouth/Throat:      Mouth: Mucous membranes are moist.      Pharynx: No oropharyngeal exudate or posterior oropharyngeal erythema.   Cardiovascular:      Rate and Rhythm: Normal rate and regular rhythm.      Pulses: Normal pulses.      Heart sounds: Normal heart sounds. No murmur heard.  Pulmonary:      Effort: Pulmonary effort is normal. No respiratory distress.      Breath sounds: Normal breath sounds.   Musculoskeletal:         General: Tenderness present. No swelling.   Skin:     General: Skin is warm and dry.   Neurological:      General: No focal deficit present.      Mental Status: She is alert and oriented to person, place, and time.   Psychiatric:         Mood and Affect: Mood normal.         Laboratory:          No results for input(s): \"ALTSGPT\", \"ASTSGOT\", \"ALKPHOSPHAT\", \"TBILIRUBIN\", \"DBILIRUBIN\", \"GAMMAGT\", " "\"AMYLASE\", \"LIPASE\", \"ALB\", \"PREALBUMIN\", \"GLUCOSE\" in the last 72 hours.      No results for input(s): \"NTPROBNP\" in the last 72 hours.      No results for input(s): \"TROPONINT\" in the last 72 hours.    Imaging:  DX-HIP-COMPLETE - UNILATERAL 2+ RIGHT   Final Result      New flattening of the right femoral head is consistent with subchondral collapse.      CT-HIP W/O PLUS RECONS RIGHT   Final Result      1.  There is subtle fracturing of the right acetabulum superiorly.   2.  There is also subchondral fracturing and subchondral collapse of the right femoral head which may be due to underlying osteonecrosis.      CT-LSPINE W/O PLUS RECONS   Final Result      1.  Scoliosis and severe multilevel degenerative disease.   2.  Right-sided pars defect at L5.   3.  No definite acute fracture of the lumbar spine.        Assessment/Plan:  Justification for Admission Status  I anticipate this patient will require at least two midnights for appropriate medical management, necessitating inpatient admission because likely need for hip replacement    * Closed fracture of right acetabulum (HCC)- (present on admission)  Assessment & Plan  Has been complaining of worsening right hip pain over the past week, the past couple days she has been unable to walk  Also seems to have osteonecrosis of the right hip.  EDP discussed with Ortho regarding these findings will evaluate the patient and discuss surgery with her  N.p.o., pain management, PT, OT    Osteonecrosis of right hip (HCC)- (present on admission)  Assessment & Plan  Findings suggestive of osteonecrosis on CT scan  Denies long-term steroid use, tobacco/alcohol/drug use.  Does not take any medications  See above        VTE prophylaxis: enoxaparin ppx  "

## 2025-04-27 NOTE — PROGRESS NOTES
1249: Performed assessment  Pt is A&Ox4, complains of 2/10 pain; declines medicinal intervention/hot & cold pack, Updated on POC: mobilize as tolerated, work with PT/OT, manage pain  Call light within reach, hourly rounding in place, bed in lowest position, family at bedside; used for interpretation.

## 2025-04-27 NOTE — CONSULTS
ORTHOPEDIC SURGERY CONSULT NOTE      Reason for Consult  Right acetabular fracture with AVN of the femoral head    Consulting Physician  ED physician     LAUREN Mendenhall Hira Barry is a 69 y.o. female atraumatic right hip pain presents to the emergency room after several times presented to urgent cares.  She was found to have a right nondisplaced acetabular fracture with associated avascular necrosis of the femoral head.  She denies any history of trauma she denies any smoking tobacco or alcohol use she denies any history of prolonged steroid use.  She is otherwise healthy does not take any medications.  She is ambulatory at baseline.  She denies any fevers chills or recent illnesses    PMH/PSH  Past Medical History:   Diagnosis Date    Hypertension        Past Surgical History:   Procedure Laterality Date    NO PERTINENT PAST SURGICAL HISTORY         Meds  No current facility-administered medications on file prior to encounter.     Current Outpatient Medications on File Prior to Encounter   Medication Sig Dispense Refill    predniSONE (DELTASONE) 20 MG Tab Take 2 Tablets by mouth every day for 5 days. 10 Tablet 0    omeprazole (PRILOSEC) 20 MG delayed-release capsule Take 1 Capsule by mouth every day. 30 Capsule 0    FEROSUL 325 (65 Fe) MG tablet Take 325 mg by mouth every 3 days.      traMADol (ULTRAM) 50 MG Tab Take 50 mg by mouth every 8 hours as needed for Moderate Pain.      acetaminophen (TYLENOL) 325 MG Tab Take 650 mg by mouth every 8 hours as needed for Moderate Pain. Taking with the tramadol         Allergies  Naproxen    Social History  Social History     Socioeconomic History    Marital status:    Tobacco Use    Smoking status: Never    Smokeless tobacco: Never   Vaping Use    Vaping status: Never Used   Substance and Sexual Activity    Alcohol use: No    Drug use: No     Social Drivers of Health     Food Insecurity: No Food Insecurity (3/6/2025)    Hunger Vital Sign     Worried About Running  Out of Food in the Last Year: Never true     Ran Out of Food in the Last Year: Never true   Transportation Needs: No Transportation Needs (3/6/2025)    PRAPARE - Transportation     Lack of Transportation (Medical): No     Lack of Transportation (Non-Medical): No   Intimate Partner Violence: Not At Risk (3/4/2025)    Humiliation, Afraid, Rape, and Kick questionnaire     Fear of Current or Ex-Partner: No     Emotionally Abused: No     Physically Abused: No     Sexually Abused: No   Housing Stability: Low Risk  (3/6/2025)    Housing Stability Vital Sign     Unable to Pay for Housing in the Last Year: No     Number of Times Moved in the Last Year: 0     Homeless in the Last Year: No       Family History  History reviewed. No pertinent family history.    ROS  Constitutional: Denies fevers, chills, weight loss   ENT: negative for eye changes, oral lesions, difficulty swallowing   Cardiac: Denies chest pain or palpitations   Lungs: Denies cough or shortness of breath   GI: Negative except noted in the HPI   : Denies dysuria, hematuria   Skin: Denies rashes or other skin lesions   MSK: Denies joint deformities, reports right hip and groin pain  Lymph: Denies enlarged nodes, night sweats, abnormal bleeding   Psych: negative for depressed mood, anxiety   Neuro: negative for confusion, weakness, sensory changes      Physical Exam  /58   Pulse 68   Temp 36.7 °C (98.1 °F) (Temporal)   Resp 18   Wt 44.5 kg (98 lb)   SpO2 97%   BMI 17.92 kg/m²   General - alert, oriented, NAD  HEENT - AT/NC  CV - RRR  Respiratory - no increased work of breath  Abdominal - no bloating  Neurologic - normal gait, no balance issues  MSK -   Right lower extremity.  No open wounds or abrasions.  There is a lidocaine patch on the anterior aspect of the hip.  She has no pain with short arc range of motion no pain with axial loading no pain with logroll.  She is nontender to palpation about the lateral aspect of the trochanter.  She has  intact sensation to the sciatic nerve she is a 2+ DP pulse.  She has full motion through sciatic and femoral nerve distributions.        Labs  Wbc 7    Imaging    DX-HIP-COMPLETE - UNILATERAL 2+ RIGHT   Final Result      New flattening of the right femoral head is consistent with subchondral collapse.      CT-HIP W/O PLUS RECONS RIGHT   Final Result      1.  There is subtle fracturing of the right acetabulum superiorly.   2.  There is also subchondral fracturing and subchondral collapse of the right femoral head which may be due to underlying osteonecrosis.      CT-LSPINE W/O PLUS RECONS   Final Result      1.  Scoliosis and severe multilevel degenerative disease.   2.  Right-sided pars defect at L5.   3.  No definite acute fracture of the lumbar spine.          Procedures  none    Assessment  Shannan Barry is a 69 y.o. female right nondisplaced acetabular fracture with associated avascular necrosis and collapse of the femoral head.    Plan  - Touchdown weightbearing in the right lower extremity for 4 weeks  - No additional surgeries are indicated  CT scan was reviewed with Dr. Mauricio as well  - I did discuss at length with the patient about her injury and how she may need a hip replacement eventually secondary to avascular necrosis of the femoral head but given her acetabular fracture I do not think she requires any surgery at this time.  Will manage her symptoms as she improves in the next 4 weeks.  She will follow-up with us as an outpatient when she has appropriate pain control and ambulates well physical therapy.      Ismael Mackay M.D.  [facility]

## 2025-04-27 NOTE — ASSESSMENT & PLAN NOTE
4/27: Hemoglobin this morning was 8.7, at the beginning of last month was 10.4.  Will repeat hemoglobin to make sure it is not downtrending.  Continue to monitor closely.    4/28: Hb this morning was 8.6, however yesterday was 8.7.  Will order iron studies.  Monitor closely.

## 2025-04-28 PROBLEM — R73.9 HYPERGLYCEMIA: Status: ACTIVE | Noted: 2025-04-28

## 2025-04-28 LAB
ALBUMIN SERPL BCP-MCNC: 3 G/DL (ref 3.2–4.9)
ALBUMIN/GLOB SERPL: 0.8 G/DL
ALP SERPL-CCNC: 129 U/L (ref 30–99)
ALT SERPL-CCNC: 23 U/L (ref 2–50)
ANION GAP SERPL CALC-SCNC: 12 MMOL/L (ref 7–16)
AST SERPL-CCNC: 16 U/L (ref 12–45)
BILIRUB SERPL-MCNC: 0.3 MG/DL (ref 0.1–1.5)
BUN SERPL-MCNC: 19 MG/DL (ref 8–22)
CALCIUM ALBUM COR SERPL-MCNC: 10.2 MG/DL (ref 8.5–10.5)
CALCIUM SERPL-MCNC: 9.4 MG/DL (ref 8.5–10.5)
CHLORIDE SERPL-SCNC: 101 MMOL/L (ref 96–112)
CO2 SERPL-SCNC: 22 MMOL/L (ref 20–33)
CREAT SERPL-MCNC: 0.48 MG/DL (ref 0.5–1.4)
EKG IMPRESSION: NORMAL
ERYTHROCYTE [DISTWIDTH] IN BLOOD BY AUTOMATED COUNT: 43.7 FL (ref 35.9–50)
EST. AVERAGE GLUCOSE BLD GHB EST-MCNC: 146 MG/DL
GFR SERPLBLD CREATININE-BSD FMLA CKD-EPI: 103 ML/MIN/1.73 M 2
GLOBULIN SER CALC-MCNC: 4 G/DL (ref 1.9–3.5)
GLUCOSE SERPL-MCNC: 118 MG/DL (ref 65–99)
HBA1C MFR BLD: 6.7 % (ref 4–5.6)
HCT VFR BLD AUTO: 28.3 % (ref 37–47)
HGB BLD-MCNC: 8.6 G/DL (ref 12–16)
MCH RBC QN AUTO: 26.5 PG (ref 27–33)
MCHC RBC AUTO-ENTMCNC: 30.4 G/DL (ref 32.2–35.5)
MCV RBC AUTO: 87.1 FL (ref 81.4–97.8)
PLATELET # BLD AUTO: 427 K/UL (ref 164–446)
PMV BLD AUTO: 8.9 FL (ref 9–12.9)
POTASSIUM SERPL-SCNC: 4.2 MMOL/L (ref 3.6–5.5)
PROT SERPL-MCNC: 7 G/DL (ref 6–8.2)
RBC # BLD AUTO: 3.25 M/UL (ref 4.2–5.4)
SODIUM SERPL-SCNC: 135 MMOL/L (ref 135–145)
WBC # BLD AUTO: 9.2 K/UL (ref 4.8–10.8)

## 2025-04-28 PROCEDURE — 96372 THER/PROPH/DIAG INJ SC/IM: CPT

## 2025-04-28 PROCEDURE — 99233 SBSQ HOSP IP/OBS HIGH 50: CPT | Mod: 25 | Performed by: INTERNAL MEDICINE

## 2025-04-28 PROCEDURE — A9270 NON-COVERED ITEM OR SERVICE: HCPCS | Performed by: INTERNAL MEDICINE

## 2025-04-28 PROCEDURE — 80053 COMPREHEN METABOLIC PANEL: CPT

## 2025-04-28 PROCEDURE — 97535 SELF CARE MNGMENT TRAINING: CPT

## 2025-04-28 PROCEDURE — 97165 OT EVAL LOW COMPLEX 30 MIN: CPT

## 2025-04-28 PROCEDURE — G0378 HOSPITAL OBSERVATION PER HR: HCPCS

## 2025-04-28 PROCEDURE — 94760 N-INVAS EAR/PLS OXIMETRY 1: CPT

## 2025-04-28 PROCEDURE — 83036 HEMOGLOBIN GLYCOSYLATED A1C: CPT

## 2025-04-28 PROCEDURE — 700111 HCHG RX REV CODE 636 W/ 250 OVERRIDE (IP): Mod: JZ | Performed by: STUDENT IN AN ORGANIZED HEALTH CARE EDUCATION/TRAINING PROGRAM

## 2025-04-28 PROCEDURE — 93010 ELECTROCARDIOGRAM REPORT: CPT | Performed by: INTERNAL MEDICINE

## 2025-04-28 PROCEDURE — 700102 HCHG RX REV CODE 250 W/ 637 OVERRIDE(OP): Performed by: INTERNAL MEDICINE

## 2025-04-28 PROCEDURE — 93005 ELECTROCARDIOGRAM TRACING: CPT | Mod: TC | Performed by: INTERNAL MEDICINE

## 2025-04-28 PROCEDURE — 99497 ADVNCD CARE PLAN 30 MIN: CPT | Performed by: INTERNAL MEDICINE

## 2025-04-28 PROCEDURE — 36415 COLL VENOUS BLD VENIPUNCTURE: CPT

## 2025-04-28 PROCEDURE — 85027 COMPLETE CBC AUTOMATED: CPT

## 2025-04-28 RX ORDER — ACETAMINOPHEN 500 MG
1000 TABLET ORAL 3 TIMES DAILY
Status: DISCONTINUED | OUTPATIENT
Start: 2025-04-28 | End: 2025-04-29 | Stop reason: HOSPADM

## 2025-04-28 RX ADMIN — ACETAMINOPHEN 1000 MG: 500 TABLET, FILM COATED ORAL at 21:16

## 2025-04-28 RX ADMIN — ACETAMINOPHEN 1000 MG: 500 TABLET, FILM COATED ORAL at 09:21

## 2025-04-28 RX ADMIN — ACETAMINOPHEN 1000 MG: 500 TABLET, FILM COATED ORAL at 17:24

## 2025-04-28 RX ADMIN — ENOXAPARIN SODIUM 40 MG: 100 INJECTION SUBCUTANEOUS at 17:26

## 2025-04-28 ASSESSMENT — PAIN DESCRIPTION - PAIN TYPE
TYPE: ACUTE PAIN

## 2025-04-28 ASSESSMENT — ACTIVITIES OF DAILY LIVING (ADL): TOILETING: INDEPENDENT

## 2025-04-28 ASSESSMENT — COGNITIVE AND FUNCTIONAL STATUS - GENERAL
DRESSING REGULAR LOWER BODY CLOTHING: A LITTLE
SUGGESTED CMS G CODE MODIFIER DAILY ACTIVITY: CJ
DAILY ACTIVITIY SCORE: 22
HELP NEEDED FOR BATHING: A LITTLE

## 2025-04-28 ASSESSMENT — ENCOUNTER SYMPTOMS
CHILLS: 0
BLURRED VISION: 0
DOUBLE VISION: 0
SHORTNESS OF BREATH: 0
MYALGIAS: 1
VOMITING: 0
DIZZINESS: 0
PALPITATIONS: 0
ABDOMINAL PAIN: 0
NERVOUS/ANXIOUS: 0
HEARTBURN: 0
COUGH: 0
HEADACHES: 0
FEVER: 0

## 2025-04-28 ASSESSMENT — GAIT ASSESSMENTS: DISTANCE (FEET): 15

## 2025-04-28 ASSESSMENT — LIFESTYLE VARIABLES: SUBSTANCE_ABUSE: 0

## 2025-04-28 NOTE — DIETARY
"Nutrition Services: Initial Assessment     Day 0 of admit. Shannan Barry is 69 y.o., female with admitting DX of Closed right hip fracture, initial encounter ([facility]) [S72.001A].    Consult Received for: MST - Malnutrition Screening Tool and failure to thrive.  BMI <19.    Current Hospital Problems List:    Principal Problem:    Closed fracture of right acetabulum (HCC) (POA: Yes)  Active Problems:    Osteonecrosis of right hip (HCC) (POA: Yes)    Anemia (POA: Unknown)    Advance care planning (POA: Unknown)    Failure to thrive in adult (POA: Unknown)    Hyperglycemia (POA: Unknown)  Resolved Problems:    * No resolved hospital problems. *    Nutrition Assessment:      Height:  5'2\"  Weight: 44.5 kg (98 lb)  Weight taken via: Stand Up Scale  BMI Calculated:  17.92  BMI Classification: Underweight       Weight Readings from Last 10 encounters:   Wt Readings from Last 10 Encounters:   04/27/25 44.5 kg (98 lb)   04/24/25 44.5 kg (98 lb)   04/24/25 44.5 kg (98 lb)   03/04/25 44.2 kg (97 lb 7.1 oz)   03/04/25 43.1 kg (95 lb)   03/01/25 43.9 kg (96 lb 12.5 oz)   02/27/25 44.1 kg (97 lb 3.6 oz)   02/18/25 43.4 kg (95 lb 10.9 oz)   04/02/24 44.9 kg (99 lb)   03/18/24 44.9 kg (99 lb)       Objective:   Pertinent Labs: 4/28: Glu 118 (H), Alb 3 (L), A1C 6.7 (H)  Pertinent Meds: Ferrous sulfate  Skin/Wounds:  No skin injury per RN skin assessment  Food Allergies: None known  Last BM:     04/26/25       Current Diet Order/Intake:   Regular diet  Recorded PO intake 4/27: breakfast 25-50%, lunch %.    Subjective:   Spoke with patient and family at bedside. Patient declined the use of  and requested family to speak with RD. Per family, patient's usual weight is 104-105 lb and she lost weight in March when she was last hospitalized. Family states current weight was an estimate as patient did not stand on a scale at admit. Discussed with RN and requested a new weight when feasible.    Patient was eating " lunch of chicken, rice, and green beans at time of visit. Per family, patient does not eat eggs, milk, or red meat. She likes chicken and will also eat nuts and peanut butter. Family has been encouraging patient to eat and asked for a handout on ways to increase kcal and protein which was provided in both english and Turkish. Discussed option of oral nutrition supplement and patient is receptive to receiving Ensure.      Nutrition Focused Physical Exam (NFPE)  Weight Loss: Per chart review above, weight has been stable over the past year. However, many recorded weights appear to be stated or estimated. Requested a measured weight when feasible.  Muscle Mass: Mild Wasting at temples  Subcutaneous Fat: Mild Loss at orbitals and buccal area.  Fluid Accumulation: None noted  Reduced  Strength: N/A in acute care setting.    Nutrition Diagnosis:      Inadequate Oral Intake related to poor appetite/food preferences as evidenced by variable PO intake recorded from 25-50% to % in patient with multiple food preferences.      Based on RD assessment at this time, Unable to fully assess for malnutrition at this time. Requested measured weight.    Nutrition Interventions:      Recommend Ensure Plus High Protein (provides 350 calories) 20 g protein per 8 fl oz) TID.  Patient aware of active plan of care as appropriate.       Nutrition Monitoring and Evaluation:      Monitor nutrition POC, goal for >50% intake from meals and supplements.  Additional fluids per MD/DO  Monitor vital signs pertinent to nutrition.    RD following and will provide updated recommendations as indicated.      Mone Silver R.D.                                         ASPEN/AND CRITERIA FOR MALNUTRITION

## 2025-04-28 NOTE — CARE PLAN
The patient is Stable - Low risk of patient condition declining or worsening    Shift Goals  Clinical Goals: pt will not sustain a fall this shift.  Patient Goals: sleep comfortably  Family Goals: Stay updated on POC    Progress made toward(s) clinical / shift goals:      Pt placed on low fall precautions. No falls this shift.    Problem: Knowledge Deficit - Standard  Goal: Patient and family/care givers will demonstrate understanding of plan of care, disease process/condition, diagnostic tests and medications  Outcome: Progressing     Problem: Psychosocial  Goal: Patient's level of anxiety will decrease  Outcome: Progressing  Goal: Patient's ability to verbalize feelings about condition will improve  Outcome: Progressing  Goal: Patient's ability to re-evaluate and adapt role responsibilities will improve  Outcome: Progressing     Problem: Communication  Goal: The ability to communicate needs accurately and effectively will improve  Outcome: Progressing     Problem: Pain - Standard  Goal: Alleviation of pain or a reduction in pain to the patient’s comfort goal  Outcome: Progressing       Patient is not progressing towards the following goals:

## 2025-04-28 NOTE — PROGRESS NOTES
Pt is awake in bed. Pt c/o 5/10 pain in R hip. Pt declines pain intervention at this time. No n/v. CMS to RLE is intact. RN discussed POC with pt and family. All questions answered. Fall precautions in place.

## 2025-04-28 NOTE — PROGRESS NOTES
"Hospital Medicine Daily Progress Note    Date of Service  4/28/2025    Chief Complaint  Shannan Barry is a 69 y.o. female admitted 4/26/2025 with hip pain.    Hospital Course  As per chart review:  \"69 y.o. female who presented 4/26/2025 with hip pain.  Complaining of right groin and hip pain that is worsening over the past week, over the past 2 days she has not been able to walk due to the pain.  Family states they have to carry her around because she cannot walk.  Denies any recent falls or trauma.  Denies numbness, tingling, stool or bladder incontinence.  No fever/chills.     In the ED afebrile, hemodynamically stable.  CT of her right hip showed acetabular fracture and concern for osteonecrosis.  Denies long-term steroid use, smoking or alcohol use.\"    Interval Problem Update  4/27: Patient seen at bedside this morning.  Complaining of hip pain.  Orthopedic surgery evaluated the patient this morning and are not recommending further surgical treatment at this time.  Pending PT/OT evaluation and pain management.    4/28: Patient seen at bedside this morning.  Still complaining of pain.  We have added scheduled Tylenol.  Will continue with as needed oxycodone, if the patient continues with pain we will consider adding gabapentin.  Hemoglobin this morning was 8.6 however yesterday was 8.7.  Pending iron studies.  Continue to monitor closely.    I have discussed this patient's plan of care and discharge plan at IDT rounds today with Case Management, Nursing, Nursing leadership, and other members of the IDT team.    Consultants/Specialty  orthopedics    Code Status  Full Code    Disposition  The patient is not medically cleared for discharge to home or a post-acute facility.        Review of Systems  Review of Systems   Constitutional:  Positive for malaise/fatigue. Negative for chills and fever.   HENT:  Negative for hearing loss and nosebleeds.    Eyes:  Negative for blurred vision and double vision. "   Respiratory:  Negative for cough and shortness of breath.    Cardiovascular:  Negative for chest pain and palpitations.   Gastrointestinal:  Negative for abdominal pain, heartburn and vomiting.   Genitourinary:  Negative for dysuria and urgency.   Musculoskeletal:  Positive for joint pain and myalgias.   Skin:  Negative for itching and rash.   Neurological:  Negative for dizziness and headaches.   Psychiatric/Behavioral:  Negative for substance abuse. The patient is not nervous/anxious.    All other systems reviewed and are negative.       Physical Exam  Temp:  [36.2 °C (97.2 °F)-36.3 °C (97.4 °F)] 36.3 °C (97.4 °F)  Pulse:  [75-87] 87  Resp:  [16] 16  BP: (109-124)/(63-71) 113/63  SpO2:  [95 %-96 %] 96 %    Physical Exam  Vitals and nursing note reviewed.   Constitutional:       General: She is not in acute distress.     Appearance: She is ill-appearing.      Comments: Thin, frail, cachectic   HENT:      Head: Normocephalic and atraumatic.      Right Ear: External ear normal.      Left Ear: External ear normal.      Mouth/Throat:      Pharynx: No oropharyngeal exudate or posterior oropharyngeal erythema.   Eyes:      General:         Right eye: No discharge.         Left eye: No discharge.   Cardiovascular:      Rate and Rhythm: Normal rate and regular rhythm.      Pulses: Normal pulses.      Heart sounds: No murmur heard.     No gallop.   Pulmonary:      Effort: Pulmonary effort is normal. No respiratory distress.      Breath sounds: Normal breath sounds. No wheezing or rhonchi.   Abdominal:      General: Bowel sounds are normal. There is no distension.      Palpations: Abdomen is soft.      Tenderness: There is no abdominal tenderness. There is no guarding.   Musculoskeletal:         General: Tenderness present.      Cervical back: Normal range of motion and neck supple. No tenderness.   Skin:     General: Skin is warm and dry.   Neurological:      Mental Status: She is alert and oriented to person, place, and  time.   Psychiatric:         Mood and Affect: Mood normal.         Behavior: Behavior normal.         Fluids    Intake/Output Summary (Last 24 hours) at 4/28/2025 1302  Last data filed at 4/27/2025 2044  Gross per 24 hour   Intake 360 ml   Output 300 ml   Net 60 ml        Laboratory  Recent Labs     04/27/25  0533 04/27/25  1230 04/28/25  0121   WBC 7.0  --  9.2   RBC 3.24*  --  3.25*   HEMOGLOBIN 8.7* 9.0* 8.6*   HEMATOCRIT 28.5* 29.6* 28.3*   MCV 88.0  --  87.1   MCH 26.9*  --  26.5*   MCHC 30.5*  --  30.4*   RDW 45.1  --  43.7   PLATELETCT 427  --  427   MPV 8.9*  --  8.9*     Recent Labs     04/27/25  0533 04/28/25  0121   SODIUM 136 135   POTASSIUM 4.1 4.2   CHLORIDE 103 101   CO2 21 22   GLUCOSE 103* 118*   BUN 17 19   CREATININE 0.52 0.48*   CALCIUM 9.1 9.4                   Imaging  DX-HIP-COMPLETE - UNILATERAL 2+ RIGHT   Final Result      New flattening of the right femoral head is consistent with subchondral collapse.      CT-HIP W/O PLUS RECONS RIGHT   Final Result      1.  There is subtle fracturing of the right acetabulum superiorly.   2.  There is also subchondral fracturing and subchondral collapse of the right femoral head which may be due to underlying osteonecrosis.      CT-LSPINE W/O PLUS RECONS   Final Result      1.  Scoliosis and severe multilevel degenerative disease.   2.  Right-sided pars defect at L5.   3.  No definite acute fracture of the lumbar spine.           Assessment/Plan  * Closed fracture of right acetabulum (HCC)- (present on admission)  Assessment & Plan  Has been complaining of worsening right hip pain over the past week, the past couple days she has been unable to walk  Also seems to have osteonecrosis of the right hip.    4/27: Orthopedic surgery not recommending surgery at this time.  PT/OT evaluation.  Pain management.    4/28: Orthopedic surgery not recommending surgery at this time.  I will order HH. Continue pain management.  I have added scheduled Tylenol and we will do  as needed oxycodone.  If needed we will add further pain medications including gabapentin.    Hyperglycemia  Assessment & Plan  Pending A1c  monitor    Failure to thrive in adult  Assessment & Plan  consulted Dietary  Patient states that she has been losing weight.  BMI of 17.92    Anemia  Assessment & Plan  4/27: Hemoglobin this morning was 8.7, at the beginning of last month was 10.4.  Will repeat hemoglobin to make sure it is not downtrending.  Continue to monitor closely.    4/28: Hb this morning was 8.6, however yesterday was 8.7.  Will order iron studies.  Monitor closely.    Osteonecrosis of right hip (HCC)- (present on admission)  Assessment & Plan  Findings suggestive of osteonecrosis on CT scan  Denies long-term steroid use, tobacco/alcohol/drug use.  Does not take any medications    4/28: Orthopedic surgery not recommending surgery at this time.  I will order HH. Continue pain management.  I have added scheduled Tylenol and we will do as needed oxycodone.  If needed we will add further pain medications including gabapentin.    Advance care planning  Assessment & Plan  4/28: I discussed with patient for at least 16 minutes further goals of care.  The patient was interested in filling out a POLST form.  We filled out a POLST form together at bedside.  The patient would like to have CPR attempted.  The patient like to have full treatment options available including intubation, mechanical ventilation and transferred to the ICU as needed.  The patient like to have long-term artificial nutrition/feeding tube as needed.  The patient has decisional capacity.  The patient has signed the POLST form.  I have given the POLST form to nursing to upload to the system.         VTE prophylaxis: Lovenox    I have performed a physical exam and reviewed and updated ROS and Plan today (4/28/2025). In review of yesterday's note (4/27/2025), there are no changes except as documented above.      I spend at least 52 minutes  providing care for this patient.  This included face-to-face interview, physical examination.  Review of lab work including CBC, CMP.  Discussing with multidisciplinary team including case management, nursing staff and pharmacy.  Creating plan of care, reviewing orders.    Moreover, I discussed with patient for at least 16 minutes further goals of care.  The patient was interested in filling out a POLST form.  We filled out a POLST form together at bedside.  The patient would like to have CPR attempted.  The patient like to have full treatment options available including intubation, mechanical ventilation and transferred to the ICU as needed.  The patient like to have long-term artificial nutrition/feeding tube as needed.  The patient has decisional capacity.  The patient has signed the POLST form.  I have given the POLST form to nursing to upload to the system.

## 2025-04-28 NOTE — DISCHARGE PLANNING
Received Choice Form at: 1548  Agency/Facility Name: [facility], [facility], [facility], [facility], [facility]  Sent Referral per Choice Form at: 8660  Declined 15:47    Sent to next  [place]   15:48

## 2025-04-28 NOTE — THERAPY
Occupational Therapy   Initial Evaluation     Patient Name: Shannan Barry  Age:  69 y.o., Sex:  female  Medical Record #: [id number]  Today's Date: 4/28/2025     Precautions  Precautions: (P) Toe Touch Weight Bearing Right Lower Extremity    Assessment  Patient is 69 y.o. female presented w/ R hip pain, admitted with a diagnosis of closed fx of R acetabulum.  PMH - HTN.  Ortho consult TTWB 4 weeks w/ follow up.  Additional factors influencing patient status / progress: TTWB RLE.  Pt resides in a ground floor apartment in [place], [place] w/ her , son, daughter and 2 year old grandchild. Family are available to assist as needed. PLOF Mod I to Indep for ADL's, tramsfers and ambulation w/out a device.  Therapist reviewed environmental/safety awareness, fall precautions, joint protection, TTWB RLE, TTB training, AE/DME, ADL's and transfers.    Plan    Occupational Therapy Initial Treatment Plan   Treatment Interventions: (P) Self Care / Activities of Daily Living, Therapeutic Activity, Adaptive Equipment, Neuro Re-Education / Balance  Duration: (P) Evaluation only    DC Equipment Recommendations: (P) Tub Transfer Bench, Front-Wheel Walker  Discharge Recommendations: (P) Anticipate that the patient will have no further occupational therapy needs after discharge from the hospital     Subjective    Pt was alert and cooperative w/ tx.     Objective       04/28/25 0835    Services   Is patient using  services for this encounter? Yes   Language Interpreted Algerian   Initial Contact Note    Initial Contact Note Order Received and Verified, Evaluation Only - Patient Does Not Require Further Acute Occupational Therapy at this Time.  However, May Benefit from Post Acute Therapy for Higher Level Functional Deficits.   Prior Living Situation   Prior Services None   Housing / Facility 1 Story Apartment / Condo   Steps Into Home 0   Steps In Home 0   Bathroom Set up Bathtub / Shower Combination;Shower  Curtain   Equipment Owned None   Lives with - Patient's Self Care Capacity Spouse;Adult Children;Child Less than 18 Years of Age   Comments Pt resides in a ground floor apartment in [place], [place] w/ her , son, daughter and 2 year old grandchild. Family are available to assist as needed. PLOF Mod I to Indep for ADL's, tramsfers and ambulation w/out a device.   Prior Level of ADL Function   Self Feeding Independent   Grooming / Hygiene Independent   Bathing Independent   Dressing Independent   Toileting Independent   Prior Level of IADL Function   Medication Management Independent   Laundry Independent   Kitchen Mobility Independent   Finances Independent   Home Management Independent   Shopping Independent   Prior Level Of Mobility Independent Without Device in Community;Independent With Steps in Community;Independent Without Device in Home   Driving / Transportation Relatives / Others Provide Transportation   History of Falls   History of Falls Yes   Date of Last Fall 04/25/25   Precautions   Precautions Toe Touch Weight Bearing Right Lower Extremity   Vitals   Pulse Oximetry 95 %   O2 (LPM) 0   O2 Delivery Device None - Room Air   Pain   Intervention Rest;Repositioned   Pain 0 - 10 Group   Location Hip   Location Orientation Right   Description Aching   Comfort Goal Comfort with Movement;Perform Activity   Cognition    Cognition / Consciousness WDL   Level of Consciousness Alert   Active ROM Upper Body   Active ROM Upper Body  WDL   Strength Upper Body   Upper Body Strength  WDL   Sensation Upper Body   Upper Extremity Sensation  WDL   Upper Body Muscle Tone   Upper Body Muscle Tone  WDL   Coordination Upper Body   Coordination WDL   Balance Assessment   Sitting Balance (Static) Good   Sitting Balance (Dynamic) Good   Standing Balance (Static) Good   Standing Balance (Dynamic) Fair +   Weight Shift Sitting Good   Weight Shift Standing Good   Bed Mobility    Supine to Sit Modified Independent   Sit to Supine  Modified Independent   Scooting Modified Independent   ADL Assessment   Upper Body Dressing Independent   Lower Body Dressing Supervision   Toileting Modified Independent   How much help from another person does the patient currently need...   Putting on and taking off regular lower body clothing? 3   Bathing (including washing, rinsing, and drying)? 3   Toileting, which includes using a toilet, bedpan, or urinal? 4   Putting on and taking off regular upper body clothing? 4   Taking care of personal grooming such as brushing teeth? 4   Eating meals? 4   6 Clicks Daily Activity Score 22   Functional Mobility   Sit to Stand Supervised   Bed, Chair, Wheelchair Transfer Supervised   Toilet Transfers Supervised   Mobility Sup FWW/TTWB RLE   Distance (Feet) 15   # of Times Distance was Traveled 2   Edema / Skin Assessment   Edema / Skin  Not Assessed   Education Group   Education Provided Joint Protection;Transfers;Role of Occupational Therapist;Activities of Daily Living;Adaptive Equipment;Use of Call Light;Weight Bearing Precautions   Role of Occupational Therapist Patient Response Patient;Acceptance;Explanation;Verbal Demonstration   Joint Protection Patient Response Patient;Acceptance;Explanation;Demonstration;Teach Back;Verbal Demonstration;Action Demonstration   Transfers Patient Response Patient;Acceptance;Explanation;Demonstration;Teach Back;Verbal Demonstration;Action Demonstration   ADL Patient Response Patient;Acceptance;Explanation;Demonstration;Teach Back;Verbal Demonstration;Action Demonstration   Adaptive Equipment Patient Response Patient;Acceptance;Explanation;Demonstration;Verbal Demonstration;Action Demonstration;Teach Back   Use of Call Light Patient Response Patient;Acceptance;Explanation;Demonstration;Verbal Demonstration   Weight Bearing Precautions Patient Response Patient;Acceptance;Explanation;Demonstration;Teach Back;Verbal Demonstration;Action Demonstration   Occupational Therapy Initial  Treatment Plan    Treatment Interventions Self Care / Activities of Daily Living;Therapeutic Activity;Adaptive Equipment;Neuro Re-Education / Balance   Duration Evaluation only   Anticipated Discharge Equipment and Recommendations   DC Equipment Recommendations Tub Transfer Bench;Front-Wheel Walker   Discharge Recommendations Anticipate that the patient will have no further occupational therapy needs after discharge from the hospital

## 2025-04-28 NOTE — DISCHARGE PLANNING
Case Management Discharge Planning    Admission Date: 4/26/2025  GMLOS: 3.5  ALOS: 0    6-Clicks ADL Score: 22  6-Clicks Mobility Score: 18      Anticipated Discharge Dispo: Discharge Disposition: Discharged to home/self care (01)    DME Needed: No    Action(s) Taken: Choice obtained for HH faxed to [facility]    Escalations Completed: None    Medically Clear: No    Next Steps: LMSW to follow for needs and barriers to discharge     Barriers to Discharge: Medical clearance

## 2025-04-29 ENCOUNTER — PHARMACY VISIT (OUTPATIENT)
Dept: PHARMACY | Facility: MEDICAL CENTER | Age: 70
End: 2025-04-29
Payer: COMMERCIAL

## 2025-04-29 VITALS
DIASTOLIC BLOOD PRESSURE: 92 MMHG | HEART RATE: 94 BPM | OXYGEN SATURATION: 92 % | WEIGHT: 98 LBS | BODY MASS INDEX: 17.92 KG/M2 | SYSTOLIC BLOOD PRESSURE: 134 MMHG | TEMPERATURE: 97.4 F | RESPIRATION RATE: 17 BRPM

## 2025-04-29 PROBLEM — Z71.89 ADVANCE CARE PLANNING: Status: RESOLVED | Noted: 2025-04-27 | Resolved: 2025-04-29

## 2025-04-29 LAB
ALBUMIN SERPL BCP-MCNC: 3 G/DL (ref 3.2–4.9)
ALBUMIN/GLOB SERPL: 0.8 G/DL
ALP SERPL-CCNC: 139 U/L (ref 30–99)
ALT SERPL-CCNC: 25 U/L (ref 2–50)
ANION GAP SERPL CALC-SCNC: 13 MMOL/L (ref 7–16)
AST SERPL-CCNC: 17 U/L (ref 12–45)
BILIRUB SERPL-MCNC: 0.3 MG/DL (ref 0.1–1.5)
BUN SERPL-MCNC: 19 MG/DL (ref 8–22)
CALCIUM ALBUM COR SERPL-MCNC: 10.1 MG/DL (ref 8.5–10.5)
CALCIUM SERPL-MCNC: 9.3 MG/DL (ref 8.5–10.5)
CHLORIDE SERPL-SCNC: 103 MMOL/L (ref 96–112)
CO2 SERPL-SCNC: 24 MMOL/L (ref 20–33)
CREAT SERPL-MCNC: 0.52 MG/DL (ref 0.5–1.4)
ERYTHROCYTE [DISTWIDTH] IN BLOOD BY AUTOMATED COUNT: 44.7 FL (ref 35.9–50)
FERRITIN SERPL-MCNC: 333 NG/ML (ref 10–291)
GFR SERPLBLD CREATININE-BSD FMLA CKD-EPI: 101 ML/MIN/1.73 M 2
GLOBULIN SER CALC-MCNC: 3.6 G/DL (ref 1.9–3.5)
GLUCOSE SERPL-MCNC: 101 MG/DL (ref 65–99)
HCT VFR BLD AUTO: 29.7 % (ref 37–47)
HGB BLD-MCNC: 9 G/DL (ref 12–16)
IRON SATN MFR SERPL: 11 % (ref 15–55)
IRON SERPL-MCNC: 19 UG/DL (ref 40–170)
MAGNESIUM SERPL-MCNC: 2.2 MG/DL (ref 1.5–2.5)
MCH RBC QN AUTO: 26.4 PG (ref 27–33)
MCHC RBC AUTO-ENTMCNC: 30.3 G/DL (ref 32.2–35.5)
MCV RBC AUTO: 87.1 FL (ref 81.4–97.8)
PHOSPHATE SERPL-MCNC: 3.2 MG/DL (ref 2.5–4.5)
PLATELET # BLD AUTO: 466 K/UL (ref 164–446)
PMV BLD AUTO: 8.9 FL (ref 9–12.9)
POTASSIUM SERPL-SCNC: 4.1 MMOL/L (ref 3.6–5.5)
PROT SERPL-MCNC: 6.6 G/DL (ref 6–8.2)
RBC # BLD AUTO: 3.41 M/UL (ref 4.2–5.4)
SODIUM SERPL-SCNC: 140 MMOL/L (ref 135–145)
TIBC SERPL-MCNC: 167 UG/DL (ref 250–450)
UIBC SERPL-MCNC: 148 UG/DL (ref 110–370)
WBC # BLD AUTO: 7.5 K/UL (ref 4.8–10.8)

## 2025-04-29 PROCEDURE — 36415 COLL VENOUS BLD VENIPUNCTURE: CPT

## 2025-04-29 PROCEDURE — 99239 HOSP IP/OBS DSCHRG MGMT >30: CPT | Performed by: HOSPITALIST

## 2025-04-29 PROCEDURE — G0378 HOSPITAL OBSERVATION PER HR: HCPCS

## 2025-04-29 PROCEDURE — RXMED WILLOW AMBULATORY MEDICATION CHARGE: Performed by: HOSPITALIST

## 2025-04-29 PROCEDURE — 85027 COMPLETE CBC AUTOMATED: CPT

## 2025-04-29 PROCEDURE — 84100 ASSAY OF PHOSPHORUS: CPT

## 2025-04-29 PROCEDURE — 82728 ASSAY OF FERRITIN: CPT

## 2025-04-29 PROCEDURE — 83540 ASSAY OF IRON: CPT

## 2025-04-29 PROCEDURE — 94760 N-INVAS EAR/PLS OXIMETRY 1: CPT

## 2025-04-29 PROCEDURE — 83735 ASSAY OF MAGNESIUM: CPT

## 2025-04-29 PROCEDURE — 700101 HCHG RX REV CODE 250: Performed by: HOSPITALIST

## 2025-04-29 PROCEDURE — 83550 IRON BINDING TEST: CPT

## 2025-04-29 PROCEDURE — A9270 NON-COVERED ITEM OR SERVICE: HCPCS | Performed by: INTERNAL MEDICINE

## 2025-04-29 PROCEDURE — 80053 COMPREHEN METABOLIC PANEL: CPT

## 2025-04-29 PROCEDURE — 700102 HCHG RX REV CODE 250 W/ 637 OVERRIDE(OP): Performed by: INTERNAL MEDICINE

## 2025-04-29 RX ORDER — LIDOCAINE 4 G/G
2 PATCH TOPICAL EVERY 24 HOURS
Status: DISCONTINUED | OUTPATIENT
Start: 2025-04-29 | End: 2025-04-29 | Stop reason: HOSPADM

## 2025-04-29 RX ORDER — METHOCARBAMOL 500 MG/1
500 TABLET, FILM COATED ORAL 3 TIMES DAILY PRN
Status: DISCONTINUED | OUTPATIENT
Start: 2025-04-29 | End: 2025-04-29 | Stop reason: HOSPADM

## 2025-04-29 RX ORDER — OXYCODONE HYDROCHLORIDE 5 MG/1
2.5 TABLET ORAL EVERY 6 HOURS PRN
Qty: 6 TABLET | Refills: 0 | Status: SHIPPED | OUTPATIENT
Start: 2025-04-29 | End: 2025-05-02

## 2025-04-29 RX ORDER — LIDOCAINE 4 G/G
2 PATCH TOPICAL EVERY 24 HOURS
Qty: 20 PATCH | Refills: 0 | Status: SHIPPED | OUTPATIENT
Start: 2025-04-30 | End: 2025-05-10

## 2025-04-29 RX ORDER — METHOCARBAMOL 500 MG/1
500 TABLET, FILM COATED ORAL 3 TIMES DAILY PRN
Qty: 20 TABLET | Refills: 0 | Status: SHIPPED | OUTPATIENT
Start: 2025-04-29 | End: 2025-05-05

## 2025-04-29 RX ADMIN — LIDOCAINE 2 PATCH: 4 PATCH TOPICAL at 11:26

## 2025-04-29 RX ADMIN — ACETAMINOPHEN 1000 MG: 500 TABLET, FILM COATED ORAL at 15:15

## 2025-04-29 RX ADMIN — ACETAMINOPHEN 1000 MG: 500 TABLET, FILM COATED ORAL at 08:47

## 2025-04-29 ASSESSMENT — PAIN DESCRIPTION - PAIN TYPE: TYPE: ACUTE PAIN

## 2025-04-29 NOTE — CARE PLAN
The patient is Stable - Low risk of patient condition declining or worsening    Shift Goals  Clinical Goals: Pain conrol, no falls, rest  Patient Goals: rest comfortably  Family Goals: Stay updated on POC    Progress made toward(s) clinical / shift goals:  ***    Patient is not progressing towards the following goals:

## 2025-04-29 NOTE — FACE TO FACE
Face to Face Note  -  Durable Medical Equipment    Peter Merchant M.D. - NPI: [id number]  I certify that this patient is under my care and that they had a durable medical equipment(DME)face to face encounter by myself that meets the physician DME face-to-face encounter requirements with this patient on:    Date of encounter:   Patient:                    MRN:                       YOB: 2024  Shannan Barry  [id number]  1955     The encounter with the patient was in whole, or in part, for the following medical condition, which is the primary reason for durable medical equipment:  Other - acetabulum  fracture    I certify that, based on my findings, the following durable medical equipment is medically necessary:    Other DME Equipment - tub transfer bench .    My Clinical findings support the need for the above equipment due to:  Abnormal Gait

## 2025-04-29 NOTE — DISCHARGE PLANNING
Case Management Discharge Planning    Admission Date: 4/26/2025  GMLOS: 3.5  ALOS: 0    6-Clicks ADL Score: 22  6-Clicks Mobility Score: 18      Anticipated Discharge Dispo: Discharge Disposition: Discharged to home/self care (01)    DME Needed: Yes    DME Ordered: Yes    Action(s) Taken: LSW spoke with pt at bedside using  for DME choice. Pt made aware that insurance may not cover a tub transfer bench and this may be an item she has to purchase privately. Pt gave choice for [facility] for a FWW and 1) [employer] 2) [employer] 3) [employer] for tub transfer bench. Choice form faxed to [facility].    Escalations Completed: None    Medically Clear: Yes    Next Steps: Care coordination will f/u with HHC and DME referrals    Barriers to Discharge: None    Is the patient up for discharge tomorrow: No-anticipated to DC home today

## 2025-04-29 NOTE — DISCHARGE SUMMARY
"Discharge Summary    CHIEF COMPLAINT ON ADMISSION  Chief Complaint   Patient presents with    Hip Pain     Right hip pain radiating from the right buttock to the right knee.        Reason for Admission  right leg oain from hip to knee     Admission Date  4/26/2025    CODE STATUS  Full Code    HPI & HOSPITAL COURSE  Please see original H&P and consult note for specific information.  69 y.o. female who presented 4/26/2025 with hip pain.  Complaining of right groin and hip pain that is worsening over the past week, over the past 2 days she has not been able to walk due to the pain.  Family states they have to carry her around because she cannot walk.  Denies any recent falls or trauma.  Denies numbness, tingling, stool or bladder incontinence.  No fever/chills.     In the ED afebrile, hemodynamically stable.  CT of her right hip showed acetabular fracture and concern for osteonecrosis.  Denies long-term steroid use, smoking or alcohol use.\" /27: Patient seen at bedside this morning.  Complaining of hip pain.  Orthopedic surgery evaluated the patient this morning and are not recommending further surgical treatment at this time.  Pending PT/OT evaluation and pain management.     4/28: Patient seen at bedside this morning.  Still complaining of pain.  We have added scheduled Tylenol.  Will continue with as needed oxycodone, if the patient continues with pain we will consider adding gabapentin.  Hemoglobin this morning was 8.6 however yesterday was 8.7.  Pending iron studies.  Continue to monitor closely.      Patient evaluated by PT OT and has been cleared for discharge, home health care has been ordered, patient's pain is improved, patient is alert oriented follows commands, patient is going to be discharged home today as per orthopedic surgery patient is to continue toe-touch weightbearing for 4 weeks and follow-up as outpatient, I discussed case with patient, she has expressed understanding of plan of care and agree " with it all questions have been answered.          Therefore, she is discharged in good and stable condition to home with organized home healthcare and close outpatient follow-up.        Discharge Date  4/29/25    FOLLOW UP ITEMS POST DISCHARGE  Primary care physician  Orthopedic surgery    DISCHARGE DIAGNOSES  Principal Problem:    Closed fracture of right acetabulum (HCC) (POA: Yes)  Active Problems:    Osteonecrosis of right hip (HCC) (POA: Yes)    Anemia (POA: Unknown)    Failure to thrive in adult (POA: Unknown)    Hyperglycemia (POA: Unknown)  Resolved Problems:    Advance care planning (POA: Unknown)      FOLLOW UP  Future Appointments   Date Time Provider Department Center   5/13/2025 10:20 AM Kevin Yanes M.D. [facility] None     Monster Maldonado M.D.  [address]  [place] [address]  [phone]    Follow up in 1 week(s)      Jose C Mauricio M.D.  [address] [facility]  [place] [address]  [phone]    Follow up in 3 week(s)        MEDICATIONS ON DISCHARGE     Medication List        START taking these medications        Instructions   Aspercreme Lidocaine 4 % Ptch  Start taking on: April 30, 2025  Generic drug: lidocaine   Place 2 Patches on the skin every 24 hours for 10 days.  Dose: 2 Patch     methocarbamol 500 MG Tabs  Commonly known as: Robaxin   Take 1 Tablet by mouth 3 times a day as needed (mild pain, muscle spasm.) for up to 6 days.  Dose: 500 mg     oxyCODONE immediate-release 5 MG Tabs  Commonly known as: Roxicodone   Take 0.5 Tablets by mouth every 6 hours as needed for Severe Pain for up to 3 days.  Dose: 2.5 mg            CONTINUE taking these medications        Instructions   acetaminophen 500 MG Tabs  Commonly known as: Tylenol   Take 1,000 mg by mouth every 8 hours as needed for Moderate Pain. Taking with the tramadol  Dose: 1,000 mg     FeroSul 325 (65 Fe) MG tablet  Generic drug: ferrous sulfate   Take 325 mg by mouth every 3 days. Pt started on 2/28/2025 for 12 day course  taking every 3 days   PRESCRIPTION COURSE WAS COMPLETED  Dose: 325 mg     SUMAtriptan 50 MG Tabs  Commonly known as: Imitrex   Take 50 mg by mouth one time as needed for Migraine.  Dose: 50 mg            STOP taking these medications      omeprazole 20 MG delayed-release capsule  Commonly known as: PriLOSEC     predniSONE 20 MG Tabs  Commonly known as: Deltasone     traMADol 50 MG Tabs  Commonly known as: Ultram              Allergies  Allergies   Allergen Reactions    Naproxen Vomiting     BLOOD PRESSURE ISSUES       DIET  Orders Placed This Encounter   Procedures    Diet Order Diet: Regular     Standing Status:   Standing     Number of Occurrences:   1     Diet::   Regular [1]       ACTIVITY  As tolerated.  Weight bearing as tolerated    CONSULTATIONS  Orthopedic surgery    PROCEDURES  none    LABORATORY  Lab Results   Component Value Date    SODIUM 140 04/29/2025    POTASSIUM 4.1 04/29/2025    CHLORIDE 103 04/29/2025    CO2 24 04/29/2025    GLUCOSE 101 (H) 04/29/2025    BUN 19 04/29/2025    CREATININE 0.52 04/29/2025        Lab Results   Component Value Date    WBC 7.5 04/29/2025    HEMOGLOBIN 9.0 (L) 04/29/2025    HEMATOCRIT 29.7 (L) 04/29/2025    PLATELETCT 466 (H) 04/29/2025        Total time of the discharge process exceeds 32 minutes.

## 2025-04-29 NOTE — PROGRESS NOTES
Educated pt and daughter on discharge instructions, rx medications  and follow up with Monster Maldonado M.D.  [address]  [address]  [place] [address]  [phone]    Follow up in 1 week(s)      Jose C Mauricio M.D.  [address]  [place] [id number] [phone]    Follow up in 3 week(s)    Message left with [facility] scheduling for pts follow up appointments.     Pt voiced understanding . VS BP (!) 134/92   Pulse 94   Temp 36.3 °C (97.4 °F) (Temporal)   Resp 17   Wt 44.5 kg (98 lb)   SpO2 92%   BMI 17.92 kg/m²    Patient alert and appropriate. All questions and concerns addressed. No further questions or concerns at this time. Copy of discharge paperwork provided.  Patient out of department with transport in stable condition.

## 2025-04-29 NOTE — DISCHARGE PLANNING
Received Choice Form at: 0951  Agency/Facility Name: [facility]  Outcome: Scanned to media only, tub transfer bench orders needed     Received Choice Form at: 9959  Agency/Facility Name: [facility] Referral per Choice Form at: 4927

## 2025-04-29 NOTE — CARE PLAN
The patient is Stable - Low risk of patient condition declining or worsening    Shift Goals  Clinical Goals: pt will remain at stated pain goal of 5/10 pain this shift.  Patient Goals: sleep comfortably  Family Goals: Stay updated on POC    Progress made toward(s) clinical / shift goals:  Pt remained free from falls; no complaints of pain overnight.      Problem: Knowledge Deficit - Standard  Goal: Patient and family/care givers will demonstrate understanding of plan of care, disease process/condition, diagnostic tests and medications  Outcome: Progressing     Problem: Communication  Goal: The ability to communicate needs accurately and effectively will improve  Outcome: Progressing     Problem: Discharge Barriers/Planning  Goal: Patient's continuum of care needs are met  Outcome: Progressing     Problem: Respiratory  Goal: Patient will achieve/maintain optimum respiratory ventilation and gas exchange  Outcome: Progressing     Problem: Fall Risk  Goal: Patient will remain free from falls  Outcome: Progressing     Patient is not progressing towards the following goals: NA

## 2025-04-29 NOTE — PROGRESS NOTES
Pt is awake in bed. VSS. Pt co 3/10 pain in R hip. No n/v. Pt is ambulating without difficulty using toe touch weight bearing to RLE. CMS is intact to RLE. RN discussed POC with pt for the evening. All questions answered.     2300: Report given to Ma.

## 2025-04-29 NOTE — CARE PLAN
The patient is Stable - Low risk of patient condition declining or worsening    Shift Goals  Clinical Goals: Free from falls this shift  Patient Goals: Go home  Family Goals: Stay updated on POC    Progress made toward(s) clinical / shift goals:    Problem: Psychosocial  Goal: Patient's level of anxiety will decrease  Outcome: Progressing       Patient is not progressing towards the following goals:

## 2025-04-29 NOTE — DISCHARGE PLANNING
Agency/Facility Name: [facility]    Outcome: Declined       Agency/Facility Name: [facility]   Outcome: DPA requested to send to [facility] as [facility]  declined    Referral sent at 09:05

## 2025-04-29 NOTE — PROGRESS NOTES
Bedside report received from night shift RN. Assumed care of patient. Daily plan of care discussed. Pt resting comfortably in bed with no signs of distress noted. Breathing even and unlabored. Call light within reach. Bed locked in lowest position. No further needs at this time.

## 2025-04-29 NOTE — DISCHARGE PLANNING
DRE requested to call [facility] to inquire who has tub transfer benches.    [facility] does not, only shower chair.   [facility] had LVM requesting return call.  [facility] carries them and DRE requested to send referral there.

## 2025-04-29 NOTE — PROGRESS NOTES
Report received from RADHA Dobbs. Pt sleeping, unlabored breathing observed. Hourly rounding in progress.

## 2025-05-01 NOTE — DISCHARGE PLANNING
Agency/Facility Name: [facility]   Outcome: DPA placed follow up call to inquire if they can accept Pt. Had to M requesting return call.

## 2025-05-05 NOTE — DISCHARGE PLANNING
Agency/Facility Name: [facility] ([facility])   Outcome:  Per MHCM, DPA was requested to close out referral in [facility] application. DPA called [facility] and left call back number as they had long wait times on phone.

## 2025-07-03 PROBLEM — M16.11 PRIMARY OSTEOARTHRITIS OF RIGHT HIP: Status: ACTIVE | Noted: 2025-07-03

## 2025-07-04 ENCOUNTER — HOSPITAL ENCOUNTER (EMERGENCY)
Facility: MEDICAL CENTER | Age: 70
End: 2025-07-04
Attending: EMERGENCY MEDICINE
Payer: COMMERCIAL

## 2025-07-04 ENCOUNTER — PHARMACY VISIT (OUTPATIENT)
Dept: PHARMACY | Facility: MEDICAL CENTER | Age: 70
End: 2025-07-04
Payer: COMMERCIAL

## 2025-07-04 ENCOUNTER — HOSPITAL ENCOUNTER (OUTPATIENT)
Dept: RADIOLOGY | Facility: MEDICAL CENTER | Age: 70
End: 2025-07-04
Attending: ORTHOPAEDIC SURGERY
Payer: COMMERCIAL

## 2025-07-04 VITALS
HEART RATE: 86 BPM | OXYGEN SATURATION: 96 % | SYSTOLIC BLOOD PRESSURE: 101 MMHG | WEIGHT: 97.99 LBS | RESPIRATION RATE: 14 BRPM | TEMPERATURE: 99.3 F | HEIGHT: 64 IN | DIASTOLIC BLOOD PRESSURE: 52 MMHG | BODY MASS INDEX: 16.73 KG/M2

## 2025-07-04 DIAGNOSIS — M79.89 SWELLING OF LIMB, RIGHT: ICD-10-CM

## 2025-07-04 DIAGNOSIS — S72.064A: ICD-10-CM

## 2025-07-04 DIAGNOSIS — I82.491 ACUTE DEEP VEIN THROMBOSIS (DVT) OF OTHER SPECIFIED VEIN OF RIGHT LOWER EXTREMITY (HCC): Primary | ICD-10-CM

## 2025-07-04 LAB
ALBUMIN SERPL BCP-MCNC: 2.9 G/DL (ref 3.2–4.9)
ALBUMIN/GLOB SERPL: 0.7 G/DL
ALP SERPL-CCNC: 98 U/L (ref 30–99)
ALT SERPL-CCNC: 14 U/L (ref 2–50)
ANION GAP SERPL CALC-SCNC: 11 MMOL/L (ref 7–16)
APTT PPP: 41.8 SEC (ref 24.7–36)
AST SERPL-CCNC: 17 U/L (ref 12–45)
BASOPHILS # BLD AUTO: 0.7 % (ref 0–1.8)
BASOPHILS # BLD: 0.05 K/UL (ref 0–0.12)
BILIRUB SERPL-MCNC: 0.4 MG/DL (ref 0.1–1.5)
BUN SERPL-MCNC: 13 MG/DL (ref 8–22)
CALCIUM ALBUM COR SERPL-MCNC: 10.1 MG/DL (ref 8.5–10.5)
CALCIUM SERPL-MCNC: 9.2 MG/DL (ref 8.5–10.5)
CHLORIDE SERPL-SCNC: 103 MMOL/L (ref 96–112)
CO2 SERPL-SCNC: 25 MMOL/L (ref 20–33)
CREAT SERPL-MCNC: 0.39 MG/DL (ref 0.5–1.4)
EOSINOPHIL # BLD AUTO: 0.18 K/UL (ref 0–0.51)
EOSINOPHIL NFR BLD: 2.5 % (ref 0–6.9)
ERYTHROCYTE [DISTWIDTH] IN BLOOD BY AUTOMATED COUNT: 44.7 FL (ref 35.9–50)
GFR SERPLBLD CREATININE-BSD FMLA CKD-EPI: 108 ML/MIN/1.73 M 2
GLOBULIN SER CALC-MCNC: 4.1 G/DL (ref 1.9–3.5)
GLUCOSE SERPL-MCNC: 90 MG/DL (ref 65–99)
HCT VFR BLD AUTO: 27.2 % (ref 37–47)
HGB BLD-MCNC: 7.9 G/DL (ref 12–16)
IMM GRANULOCYTES # BLD AUTO: 0.04 K/UL (ref 0–0.11)
IMM GRANULOCYTES NFR BLD AUTO: 0.6 % (ref 0–0.9)
INR PPP: 1.19 (ref 0.87–1.13)
LYMPHOCYTES # BLD AUTO: 0.79 K/UL (ref 1–4.8)
LYMPHOCYTES NFR BLD: 11.2 % (ref 22–41)
MCH RBC QN AUTO: 24.6 PG (ref 27–33)
MCHC RBC AUTO-ENTMCNC: 29 G/DL (ref 32.2–35.5)
MCV RBC AUTO: 84.7 FL (ref 81.4–97.8)
MICROCYTES BLD QL SMEAR: ABNORMAL
MONOCYTES # BLD AUTO: 0.54 K/UL (ref 0–0.85)
MONOCYTES NFR BLD AUTO: 7.6 % (ref 0–13.4)
NEUTROPHILS # BLD AUTO: 5.47 K/UL (ref 1.82–7.42)
NEUTROPHILS NFR BLD: 77.4 % (ref 44–72)
NRBC # BLD AUTO: 0 K/UL
NRBC BLD-RTO: 0 /100 WBC (ref 0–0.2)
OVALOCYTES BLD QL SMEAR: NORMAL
PLATELET # BLD AUTO: 398 K/UL (ref 164–446)
PLATELET BLD QL SMEAR: NORMAL
PMV BLD AUTO: 8.4 FL (ref 9–12.9)
POTASSIUM SERPL-SCNC: 3.6 MMOL/L (ref 3.6–5.5)
PROT SERPL-MCNC: 7 G/DL (ref 6–8.2)
PROTHROMBIN TIME: 15.5 SEC (ref 12–14.6)
RBC # BLD AUTO: 3.21 M/UL (ref 4.2–5.4)
RBC BLD AUTO: PRESENT
SODIUM SERPL-SCNC: 139 MMOL/L (ref 135–145)
WBC # BLD AUTO: 7.1 K/UL (ref 4.8–10.8)

## 2025-07-04 PROCEDURE — 93971 EXTREMITY STUDY: CPT | Mod: RT

## 2025-07-04 PROCEDURE — 85610 PROTHROMBIN TIME: CPT

## 2025-07-04 PROCEDURE — A9270 NON-COVERED ITEM OR SERVICE: HCPCS | Performed by: EMERGENCY MEDICINE

## 2025-07-04 PROCEDURE — 36415 COLL VENOUS BLD VENIPUNCTURE: CPT

## 2025-07-04 PROCEDURE — RXMED WILLOW AMBULATORY MEDICATION CHARGE: Performed by: EMERGENCY MEDICINE

## 2025-07-04 PROCEDURE — 85730 THROMBOPLASTIN TIME PARTIAL: CPT

## 2025-07-04 PROCEDURE — 85025 COMPLETE CBC W/AUTO DIFF WBC: CPT

## 2025-07-04 PROCEDURE — 99284 EMERGENCY DEPT VISIT MOD MDM: CPT

## 2025-07-04 PROCEDURE — 80053 COMPREHEN METABOLIC PANEL: CPT

## 2025-07-04 PROCEDURE — 700102 HCHG RX REV CODE 250 W/ 637 OVERRIDE(OP): Performed by: EMERGENCY MEDICINE

## 2025-07-04 RX ADMIN — RIVAROXABAN 15 MG: 15 TABLET, FILM COATED ORAL at 16:10

## 2025-07-04 ASSESSMENT — FIBROSIS 4 INDEX: FIB4 SCORE: 0.5

## 2025-07-04 NOTE — DISCHARGE PLANNING
Received [employer] message regarding DC with anticoagulant.     Talked to Dr. Cohen and he ordered Xarelto.     CM called [facility] and was informed that medication is 100% covered.     CM talked to pt and son. Son has been given information to  medication at [address] [place] [address] Pharmacy.

## 2025-07-04 NOTE — ED PROVIDER NOTES
"ED Provider Note    CHIEF COMPLAINT  Chief Complaint   Patient presents with    Hip Pain     Has been having R hip pain for about 2 months now  was seen at [facility] and testing was done   was told she as bld clot yesterday here  had US and Dx was done   also was told her R hip and that she need replacement   was sent here  due to bld clot        EXTERNAL RECORDS REVIEWED  Inpatient Notes patient was seen and admitted, I reviewed the discharge summary from April 29, 2025 when the patient had a CT scan for right hip pain and inability to walk, CT scan showed acetabular fracture and concern for osteonecrosis    HPI/ROS  LIMITATION TO HISTORY   Select: Language Mozambican,  Used   OUTSIDE HISTORIAN(S):  Family Shannan Barry is a 69 y.o. female who presents with her son stating that 2 months ago she was diagnosed with acetabular fracture and osteonecrosis, she then developed right leg swelling 1 week ago, she denies any chest pain or shortness of breath.  They were planning on doing a hip replacement at the [facility] however with the leg swelling she had an ultrasound performed today that showed DVT.  She was sent to the ER for treatment of DVT.    PAST MEDICAL HISTORY   has a past medical history of Hypertension.    SURGICAL HISTORY   has a past surgical history that includes no pertinent past surgical history.    FAMILY HISTORY  No family history on file.    SOCIAL HISTORY  Social History     Tobacco Use    Smoking status: Never    Smokeless tobacco: Never   Vaping Use    Vaping status: Never Used   Substance and Sexual Activity    Alcohol use: No    Drug use: No    Sexual activity: Not on file       CURRENT MEDICATIONS  The patient and her family deny medications      ALLERGIES  Allergies[1]    PHYSICAL EXAM  VITAL SIGNS: /52   Pulse 86   Temp 37.4 °C (99.3 °F) (Temporal)   Resp 14   Ht 1.626 m (5' 4\")   Wt 44.4 kg (97 lb 15.9 oz)   SpO2 96%   BMI 16.82 kg/m²  "     Constitutional: Alert.  HENT: No signs of trauma, Bilateral external ears normal, Nose normal.   Eyes: Pupils are equal and reactive, Conjunctiva normal, Non-icteric.   Neck: Normal range of motion, No tenderness, Supple, No stridor.   Lymphatic: No lymphadenopathy noted.   Cardiovascular: Regular rate and rhythm, no murmurs.   Thorax & Lungs: Normal breath sounds, No respiratory distress, No wheezing, No chest tenderness.   Abdomen: Bowel sounds normal, Soft, No tenderness, No peritoneal signs, No masses, No pulsatile masses.   Skin: Warm, Dry, No erythema, No rash.   Extremities: Intact distal pulses, lower extremity swelling compared to left.  No erythema.  Musculoskeletal: Good range of motion in all major joints. No tenderness to palpation or major deformities noted.   Neurologic: Alert , Normal motor function, Normal sensory function, No focal deficits noted.   Psychiatric: Affect normal, Judgment normal, Mood normal.       LABS      Labs Reviewed   CBC WITH DIFFERENTIAL - Abnormal; Notable for the following components:       Result Value    RBC 3.21 (*)     Hemoglobin 7.9 (*)     Hematocrit 27.2 (*)     MCH 24.6 (*)     MCHC 29.0 (*)     MPV 8.4 (*)     Neutrophils-Polys 77.40 (*)     Lymphocytes 11.20 (*)     Lymphs (Absolute) 0.79 (*)     All other components within normal limits   COMP METABOLIC PANEL - Abnormal; Notable for the following components:    Creatinine 0.39 (*)     Albumin 2.9 (*)     Globulin 4.1 (*)     All other components within normal limits   PROTHROMBIN TIME - Abnormal; Notable for the following components:    PT 15.5 (*)     INR 1.19 (*)     All other components within normal limits   APTT - Abnormal; Notable for the following components:    APTT 41.8 (*)     All other components within normal limits   ESTIMATED GFR   PLATELET ESTIMATE   MORPHOLOGY               COURSE & MEDICAL DECISION MAKING    ASSESSMENT, COURSE AND PLAN  Care Narrative:     Patient presents with an ultrasound  today showing DVT.  Labs were ordered to determine dosing of anticoagulant based on kidney function, INR and PTT were ordered, CBC was ordered.    the patient's labs demonstrate chronic anemia, elevated INR and PTT, normal kidney function.  I discussed this with the pharmacist Berenice at [facility] and we agree that Xarelto is the correct choice for her anticoagulation.  Here she was given 15 mg p.o. Xarelto.    The patient's labs, kidney function and coagulation profile and H&H are normal.  She is given Xarelto 15 mg p.o. here and a prescription for Xarelto to take as an outpatient.  She will return for any chest pain or shortness of breath.                  ADDITIONAL PROBLEMS MANAGED  Right acetabular fracture and right hip osteonecrosis, DVT right lower extremity    DISPOSITION AND DISCUSSIONS  I have discussed management of the patient with the following physicians and REDD's: None    Discussion of management with other [facility] or appropriate source(s): Pharmacy discussed anticoagulation choice with abnormal labs     Escalation of care considered, and ultimately not performed:acute inpatient care management, however at this time, the patient is most appropriate for outpatient management    Barriers to care at this time, including but not limited to: None.     Decision tools and prescription drugs considered including, but not limited to: Prescribed Xarelto.       The patient will return for new or worsening symptoms and is stable at the time of discharge.            DISPOSITION:  Patient will be discharged home in stable condition.    FOLLOW UP:  [facility]  [address], [address]  [facility] [id number] [phone]  Go today   prescription for Xarelto. They are open 24/7.    [facility] Emergency Dept  [address]  [facility] [id number] [phone]    If symptoms worsen    Monster Maldonado M.D.  [address]  [address]  [place]  [address]  [phone]      As needed    [facility]  [address]  [place] [address]  [phone]    Call to schedule appointment at the anticoagulation clinic      OUTPATIENT MEDICATIONS:  New Prescriptions    RIVAROXABAN (XARELTO) 15 MG TAB TABLET    Take 1 Tablet by mouth 2 times a day for 21 days.         FINAL DIAGNOSIS  1. Acute deep vein thrombosis (DVT) of other specified vein of right lower extremity (HCC)         Electronically signed by: Cullen Julio M.D., 7/4/2025 2:12 PM           [1]   Allergies  Allergen Reactions    Naproxen Vomiting     BLOOD PRESSURE ISSUES

## 2025-07-04 NOTE — ED TRIAGE NOTES
"/61   Pulse 85   Temp 37.9 °C (100.3 °F) (Temporal)   Resp 16   Ht 1.626 m (5' 4\")   Wt 44.4 kg (97 lb 15.9 oz)   SpO2 95%   BMI 16.82 kg/m²   Chief Complaint   Patient presents with    Hip Pain     Has been having R hip pain for about 2 months now  was seen at [facility] and testing was done   was told she as bld clot yesterday here  had US and Dx was done   also was told her R hip and that she need replacement   was sent here  due to bld clot      Comes in w/ son   they were brought over from [place] today  Dx w/ bld clot to R hip    "

## 2025-07-07 ENCOUNTER — TELEPHONE (OUTPATIENT)
Dept: VASCULAR LAB | Facility: MEDICAL CENTER | Age: 70
End: 2025-07-07
Payer: COMMERCIAL

## 2025-07-07 NOTE — TELEPHONE ENCOUNTER
[facility]     Received anticoagulation referral from ER on 07/04/25.     LVM with pt's daughter asking to have pt establish care    1st attempt     Insurance: [facility]  PCP: External  Locations to be seen: [facility]    If no response by 08/04/25 OR 2 no shows/cancellations, will remove from referral list    India Mandujano, PharmD  [facility]tico[facility]/Pharmacotherapy [facility]  Phone [phone]

## 2025-07-15 ENCOUNTER — TELEPHONE (OUTPATIENT)
Dept: VASCULAR LAB | Facility: MEDICAL CENTER | Age: 70
End: 2025-07-15
Payer: COMMERCIAL

## 2025-07-15 NOTE — TELEPHONE ENCOUNTER
[facility]     Received anticoagulation referral from ED on 7/4/25.     Called patient to schedule an appt to establish care. No answer. LVM.    2nd attempt     Insurance: [employer], Medicare A  PCP: External  Locations to be seen: [facility]    If no response by 8/7/25 OR 2 no shows/cancellations, will remove from referral list    Vandana Kumar, PharmD  [facility] Anticoagulation/Pharmacotherapy Clinic  Phone [phone]

## 2025-07-21 ENCOUNTER — ANTICOAGULATION VISIT (OUTPATIENT)
Dept: VASCULAR LAB | Facility: MEDICAL CENTER | Age: 70
End: 2025-07-21
Attending: INTERNAL MEDICINE
Payer: COMMERCIAL

## 2025-07-21 ENCOUNTER — DOCUMENTATION (OUTPATIENT)
Dept: VASCULAR LAB | Facility: MEDICAL CENTER | Age: 70
End: 2025-07-21

## 2025-07-21 DIAGNOSIS — I82.409 DEEP VEIN THROMBOSIS (DVT) OF LOWER EXTREMITY, UNSPECIFIED CHRONICITY, UNSPECIFIED LATERALITY, UNSPECIFIED VEIN (HCC): Primary | ICD-10-CM

## 2025-07-21 PROCEDURE — 99215 OFFICE O/P EST HI 40 MIN: CPT

## 2025-07-21 NOTE — PROGRESS NOTES
Initial anticoagulation clinic note and most recent ER note reviewed  Patient started on anticoagulation with Xarelto after being diagnosed with extensive DVT, first episode, appears unprovoked.    We'll continue with at least 6 months of oral anticoagulation after which time we can discuss risks and benefits of extended anticoagulation and/or any further workup with PCP or in vascular medicine clinic.     ACCP guidelines to have a IIb indication for extended anticoagulation in patients with unprovoked DVT/PE who tolerated anticoagulation well.    We will defer all indicated age-appropriate screening for colon cancer to PCP.    Michael J. Bloch, MD  Anticoagulation Center    Cc:  ED Maldonado

## 2025-07-21 NOTE — PROGRESS NOTES
NEW DOAC   .  Anticoagulation Summary  As of 7/21/2025      INR goal:  --   TTR:  --   INR used for dosing:  No new INR was available at the time of this encounter.   Warfarin maintenance plan:  No maintenance plan   Next INR check:  8/18/2025   Target end date:  1/5/2026    Indications    Deep vein thrombosis (HCC) [I82.409]                 Anticoagulation Episode Summary       INR check location:  --    Preferred lab:  --    Send INR reminders to:  --    Comments:  --          Anticoagulation Care Providers       Provider Role Specialty Phone number    Cullen Julio M.D. Referring Emergency Medicine [phone]    [facility] Responsible  [phone]          Anticoagulation Patient Findings  Patient Findings       Negatives:  Signs/symptoms of thrombosis, Signs/symptoms of bleeding, Laboratory test error suspected, Change in health, Change in alcohol use, Change in activity, Upcoming invasive procedure, Emergency department visit, Upcoming dental procedure, Missed doses, Extra doses, Change in medications, Change in diet/appetite, Hospital admission, Bruising, Other complaints          Utilized translation services for this encounter.  Language Line - [phone]  [employer] ID - [id number]   Dino,  ID # [id number]      PCP: Monster Maldonado M.D.  Cardiologist: None  Dx: DVT  CHADSVASC = n/a  HAS-BLED = 0  Target End Date = ~ 6 mo and reevaluate (1/5/26)    Pt Hx:   Patient presents to clinic today to establish care with our clinic as she was found to have DVT and started on Xarelto and referred to our clinic.   She has a PMH of anemia, osteoarthritis and osteonecrosis of right hip. She was in the process of scheduling total hip replacement but incidentally a DVT was found and needs to be treated prior to scheduling surgery.   Patient reports no family hx of clots, no cardiac hx.       Labs:  Lab Results   Component Value Date/Time    WBC 7.1 07/04/2025 02:31 PM    RBC 3.21  (L) 07/04/2025 02:31 PM    HEMOGLOBIN 7.9 (L) 07/04/2025 02:31 PM    HEMATOCRIT 27.2 (L) 07/04/2025 02:31 PM    MCV 84.7 07/04/2025 02:31 PM    MCH 24.6 (L) 07/04/2025 02:31 PM    MCHC 29.0 (L) 07/04/2025 02:31 PM    MPV 8.4 (L) 07/04/2025 02:31 PM    NEUTSPOLYS 77.40 (H) 07/04/2025 02:31 PM    LYMPHOCYTES 11.20 (L) 07/04/2025 02:31 PM    MONOCYTES 7.60 07/04/2025 02:31 PM    EOSINOPHILS 2.50 07/04/2025 02:31 PM    BASOPHILS 0.70 07/04/2025 02:31 PM      Lab Results   Component Value Date/Time    SODIUM 139 07/04/2025 02:31 PM    POTASSIUM 3.6 07/04/2025 02:31 PM    CHLORIDE 103 07/04/2025 02:31 PM    CO2 25 07/04/2025 02:31 PM    GLUCOSE 90 07/04/2025 02:31 PM    BUN 13 07/04/2025 02:31 PM    CREATININE 0.39 (L) 07/04/2025 02:31 PM      Referral date: 7/4/25    CrCl= ~ 95 ml/min     Pt is new to rivaroxaban (Xarelto) and new to [facility]. Discussed:   Indication for DOAC therapy.  Importance of monitoring and compliance.   Monitoring parameters, signs and symptoms of bleeding or clotting.  DOAC therapy, side effects, potential DDIs, OTC medications  Hormonal therapy: No  Pregnancy: No  DDI: No  Pt is not on antiplatelet/NSAID therapy:   Lifestyle safety, ie smoking, ETOH, hobby safety, fall safety/prevention  Procedures for missed doses or suspected missed doses, surgeries/procedures, travel, dental work, any medication changes    Start with Xarelto 15 mg taken 2 times a day for 21 days and then change to 20 mg taken once daily thereafter. Pt will transition to 20 mg daily dose on 7/26/25.    DOAC affordable: yes    Samples provided: no    Labs to be completed prior to next f/u - CBC, CMP    F/U: 4 week(s)    Aiden AmbrizD      Added [facility] to care team   Send to [facility] Consent                                             Name    Shannan Barry    MRN number: [id number]    the following are guidelines for participation in the [facility]  Pharmacotherapy Program.     I, ____Shannan Barry_____, understand and voluntarily agree to participate in the [facility] Pharmacotherapy Program and to have services provided to me by pharmacists working in collaboration with my provider.    I understand the pharmacist within the [facility] Pharmacotherapy Program may initiate, modify or discontinue my medications, order appropriate testing and appointments, perform exams, monitor treatment, and make clinical evaluations and decisions pursuant to a collaborative practice agreement with my provider.  I understand the pharmacist within the [facility] Pharmacotherapy Program is not a physician, osteopathic physician, advanced practice registered nurse or physician assistant and may not diagnose.  I will take all my medications as instructed and not change the way I take it without first talking to my provider or a pharmacist within the [facility] Pharmacotherapy Program.  I understand that if I am late to my appointment I may not be able to be seen by a pharmacist at that time and will have to reschedule my appointment.  During appointment with pharmacist I understand that pharmacist has the right not to answer questions or perform services outside the pharmacist’s scope of practice.  By signing below, I provide informed consent for the pharmacist to provide these services and for my participation in the [facility] Pharmacotherapy Program.      Shannan Barry           [id number]          07/21/25  Patient Name                   MRN number  Date     ____Obtained verbal consent from Shannan Barry

## 2025-07-29 ENCOUNTER — TELEPHONE (OUTPATIENT)
Dept: VASCULAR LAB | Facility: MEDICAL CENTER | Age: 70
End: 2025-07-29
Payer: COMMERCIAL

## 2025-07-29 NOTE — TELEPHONE ENCOUNTER
Received New Start request via [employer]  for   rivaroxaban (XARELTO) 20 MG Tab tablet  (Quantity:30, Day Supply:30)     Insurance: [facility] ([id number])   Member ID:  [id number]  BIN: [id number]  PCN: [id number]  Group: [id number]     Ran Test claim via [employer] & medication pays for $0 copay. Medication was already been released to the pharmacy since 07/24/2025    Pharmacy on file: [facility] #[id number] [place] [address]    Deepti Flowers, PREM, PhT  Vascular Pharmacy Liaison (Rx Coordinator)  P: [phone]  7/29/2025 11:37 AM

## 2025-07-30 ENCOUNTER — TELEPHONE (OUTPATIENT)
Dept: HEALTH INFORMATION MANAGEMENT | Facility: OTHER | Age: 70
End: 2025-07-30
Payer: COMMERCIAL

## 2025-08-14 ENCOUNTER — HOSPITAL ENCOUNTER (OUTPATIENT)
Facility: MEDICAL CENTER | Age: 70
End: 2025-08-14
Attending: NURSE PRACTITIONER
Payer: COMMERCIAL

## 2025-08-14 DIAGNOSIS — I82.409 DEEP VEIN THROMBOSIS (DVT) OF LOWER EXTREMITY, UNSPECIFIED CHRONICITY, UNSPECIFIED LATERALITY, UNSPECIFIED VEIN (HCC): ICD-10-CM

## 2025-08-14 LAB
ALBUMIN SERPL BCP-MCNC: 3.2 G/DL (ref 3.2–4.9)
ALBUMIN/GLOB SERPL: 0.8 G/DL
ALP SERPL-CCNC: 86 U/L (ref 30–99)
ALT SERPL-CCNC: 10 U/L (ref 2–50)
ANION GAP SERPL CALC-SCNC: 13 MMOL/L (ref 7–16)
AST SERPL-CCNC: 16 U/L (ref 12–45)
BILIRUB SERPL-MCNC: 0.7 MG/DL (ref 0.1–1.5)
BUN SERPL-MCNC: 13 MG/DL (ref 8–22)
CALCIUM ALBUM COR SERPL-MCNC: 10.3 MG/DL (ref 8.5–10.5)
CALCIUM SERPL-MCNC: 9.7 MG/DL (ref 8.5–10.5)
CHLORIDE SERPL-SCNC: 102 MMOL/L (ref 96–112)
CO2 SERPL-SCNC: 25 MMOL/L (ref 20–33)
CREAT SERPL-MCNC: 0.5 MG/DL (ref 0.5–1.4)
ERYTHROCYTE [DISTWIDTH] IN BLOOD BY AUTOMATED COUNT: 45.9 FL (ref 35.9–50)
GFR SERPLBLD CREATININE-BSD FMLA CKD-EPI: 101 ML/MIN/1.73 M 2
GLOBULIN SER CALC-MCNC: 4.1 G/DL (ref 1.9–3.5)
GLUCOSE SERPL-MCNC: 82 MG/DL (ref 65–99)
HCT VFR BLD AUTO: 29.8 % (ref 37–47)
HGB BLD-MCNC: 8.9 G/DL (ref 12–16)
MCH RBC QN AUTO: 25 PG (ref 27–33)
MCHC RBC AUTO-ENTMCNC: 29.9 G/DL (ref 32.2–35.5)
MCV RBC AUTO: 83.7 FL (ref 81.4–97.8)
PLATELET # BLD AUTO: 480 K/UL (ref 164–446)
PMV BLD AUTO: 9 FL (ref 9–12.9)
POTASSIUM SERPL-SCNC: 3.3 MMOL/L (ref 3.6–5.5)
PROT SERPL-MCNC: 7.3 G/DL (ref 6–8.2)
RBC # BLD AUTO: 3.56 M/UL (ref 4.2–5.4)
SODIUM SERPL-SCNC: 140 MMOL/L (ref 135–145)
WBC # BLD AUTO: 5.9 K/UL (ref 4.8–10.8)

## 2025-08-14 PROCEDURE — 36415 COLL VENOUS BLD VENIPUNCTURE: CPT

## 2025-08-14 PROCEDURE — 85027 COMPLETE CBC AUTOMATED: CPT

## 2025-08-14 PROCEDURE — 80053 COMPREHEN METABOLIC PANEL: CPT

## 2025-08-18 ENCOUNTER — ANTICOAGULATION VISIT (OUTPATIENT)
Dept: VASCULAR LAB | Facility: MEDICAL CENTER | Age: 70
End: 2025-08-18
Attending: INTERNAL MEDICINE
Payer: COMMERCIAL

## 2025-08-18 VITALS — DIASTOLIC BLOOD PRESSURE: 70 MMHG | SYSTOLIC BLOOD PRESSURE: 144 MMHG | HEART RATE: 80 BPM

## 2025-08-18 DIAGNOSIS — I82.409 DEEP VEIN THROMBOSIS (DVT) OF LOWER EXTREMITY, UNSPECIFIED CHRONICITY, UNSPECIFIED LATERALITY, UNSPECIFIED VEIN (HCC): Primary | ICD-10-CM

## 2025-08-18 PROCEDURE — 99214 OFFICE O/P EST MOD 30 MIN: CPT
